# Patient Record
Sex: MALE | Race: OTHER | HISPANIC OR LATINO | Employment: FULL TIME | URBAN - METROPOLITAN AREA
[De-identification: names, ages, dates, MRNs, and addresses within clinical notes are randomized per-mention and may not be internally consistent; named-entity substitution may affect disease eponyms.]

---

## 2024-02-19 ENCOUNTER — OFFICE VISIT (OUTPATIENT)
Dept: CARDIOLOGY | Facility: CLINIC | Age: 44
End: 2024-02-19
Payer: COMMERCIAL

## 2024-02-19 VITALS
DIASTOLIC BLOOD PRESSURE: 63 MMHG | SYSTOLIC BLOOD PRESSURE: 122 MMHG | WEIGHT: 206 LBS | RESPIRATION RATE: 16 BRPM | OXYGEN SATURATION: 98 % | HEART RATE: 63 BPM

## 2024-02-19 DIAGNOSIS — R07.9 CHEST PAIN, UNSPECIFIED TYPE: Primary | ICD-10-CM

## 2024-02-19 PROCEDURE — 99204 OFFICE O/P NEW MOD 45 MIN: CPT | Performed by: STUDENT IN AN ORGANIZED HEALTH CARE EDUCATION/TRAINING PROGRAM

## 2024-02-19 PROCEDURE — 93000 ELECTROCARDIOGRAM COMPLETE: CPT | Performed by: STUDENT IN AN ORGANIZED HEALTH CARE EDUCATION/TRAINING PROGRAM

## 2024-02-19 RX ORDER — ROSUVASTATIN CALCIUM 40 MG/1
40 TABLET, COATED ORAL DAILY
COMMUNITY
End: 2024-05-02 | Stop reason: SDUPTHER

## 2024-02-19 RX ORDER — ASPIRIN 81 MG/1
81 TABLET ORAL DAILY
COMMUNITY
End: 2024-05-02 | Stop reason: SDUPTHER

## 2024-02-19 RX ORDER — METOPROLOL SUCCINATE 25 MG/1
25 TABLET, EXTENDED RELEASE ORAL DAILY
COMMUNITY
End: 2024-05-02 | Stop reason: SDUPTHER

## 2024-02-19 NOTE — PROGRESS NOTES
24    Santana Casey is a 43 y.o. male who is here to establish care.  He reports that in May 2023 he presented to me with heart attack at the outside hospital and eventually underwent PCI on May 10 and May 17 with total of 6 drug-eluting stents.  He reports that he was told that his heart function was weak.  He quit the smoking in .  His family history is pertinent for premature coronary artery disease.  His father  of heart attack at age 42 and also reports 3 of his brothers  at an early age as well due to heart attack.  Today he denies having chest pain.  Shortness of breath only with moderate to heavy activity.  EKG shows normal sinus rhythm with a rate of 60 with no significant ST-T changes.  Social history  Social History     Socioeconomic History    Marital status:      Spouse name: Not on file    Number of children: Not on file    Years of education: Not on file    Highest education level: Not on file   Occupational History    Not on file   Tobacco Use    Smoking status: Not on file    Smokeless tobacco: Not on file   Substance and Sexual Activity    Alcohol use: Not on file    Drug use: Not on file    Sexual activity: Not on file   Other Topics Concern    Not on file   Social History Narrative    Not on file     Social Determinants of Health     Financial Resource Strain: Not on file   Food Insecurity: Not on file   Transportation Needs: Not on file   Physical Activity: Not on file   Stress: Not on file   Social Connections: Not on file   Intimate Partner Violence: Not on file   Housing Stability: Not on file        Family History  No family history on file.     12 system point of review is negative except for what described in history of present illness    Allergies:  No Known Allergies     Outpatient Medications:  No current outpatient medications      Last Recorded Vitals:      2024     9:13 AM   Vitals   Systolic 122   Diastolic 63   Heart Rate 63   Resp 16   Weight (lb) 206  "  Visit Report Report    Visit Vitals  /63 (Patient Position: Sitting)   Pulse 63   Resp 16   Wt 93.4 kg (206 lb)   SpO2 98%        Physical Exam:    General: Awake, alert/oriented x3, well developed, no acute distress  Head: Atraumatic/Normocephalic  Eyes: Normal external exam, EOMI, PERRLA  ENT: Oropharynx normal, moist mucous membranes  Cardiovascular: RRR, S1/S2, no murmurs, rubs, or gallops, radial pulses +2, no edema of extremities  Pulmonary: CTAB, no respiratory distress. No wheezes, rales, or ronchi  Abdomen: +BS, soft, non-tender, nondistended, no guarding or rebound  MSK: No joint swelling, normal movements of all extremities. Range of motion- normal.  Extremities: no edema, no cyanosis  Neuro: Alert/oriented x3, no focal motor or sensory deficits  Psychiatric: Judgment intact. Appropriate mood and behavior      I reviewed recent available cardiac studies.      Labs    No results found for: \"WBC\", \"HGB\", \"HCT\", \"PLT\", \"CHOL\", \"TRIG\", \"HDL\", \"LDLDIRECT\", \"ALT\", \"AST\", \"NA\", \"K\", \"CL\", \"CREATININE\", \"BUN\", \"CO2\", \"TSH\", \"PSA\", \"INR\", \"GLUF\", \"HGBA1C\", \"ALBUR\"  No results found for: \"CKTOTAL\", \"CKMB\", \"CKMBINDEX\", \"TROPONINI\"     No results found for: \"INR\", \"PROTIME\"          Assessment/Plan     Will asked the patient to sign consent form for medical release that he can receive information from outside facility regarding his cardiac care in 2023.    We will continue with current medications.    Will obtain echocardiogram to evaluate cardiac function considering his extensive CAD and history of heart arrhythmia.  For further medical management.    I asked the patient to seek emergent medical care if he develops any significant chest pain.  .      Discussed the importance of heart healthy diet and exercise.    Follow-up in clinic in 3 months with prior labs including CMP, Mg, lipids and EKG at the time of visit.   Will plan to have his LDL below 55.          Rodríguez Sanchez MD, PhD, FACC, " [facility]  Interventional Cardiology, [facility]  Associate Professor of Medicine, [facility]  Office: [phone]         **Disclaimer: This note was dictated by speech recognition, and every effort has been made to prevent any error in transcription, however minor errors may be present**

## 2024-02-27 DIAGNOSIS — I48.91 ATRIAL FIBRILLATION, UNSPECIFIED TYPE (MULTI): ICD-10-CM

## 2024-02-28 ENCOUNTER — HOSPITAL ENCOUNTER (OUTPATIENT)
Dept: CARDIOLOGY | Facility: CLINIC | Age: 44
Discharge: HOME | End: 2024-02-28
Payer: COMMERCIAL

## 2024-02-28 VITALS
BODY MASS INDEX: 30.51 KG/M2 | DIASTOLIC BLOOD PRESSURE: 63 MMHG | SYSTOLIC BLOOD PRESSURE: 122 MMHG | WEIGHT: 206 LBS | HEIGHT: 69 IN

## 2024-02-28 DIAGNOSIS — R06.02 SHORTNESS OF BREATH: ICD-10-CM

## 2024-02-28 DIAGNOSIS — Z82.41 FAMILY HISTORY OF SUDDEN CARDIAC DEATH: ICD-10-CM

## 2024-02-28 DIAGNOSIS — R07.9 CHEST PAIN, UNSPECIFIED TYPE: ICD-10-CM

## 2024-02-28 DIAGNOSIS — I25.10 ATHEROSCLEROTIC HEART DISEASE OF NATIVE CORONARY ARTERY WITHOUT ANGINA PECTORIS: ICD-10-CM

## 2024-02-28 LAB
AORTIC VALVE MEAN GRADIENT: 2.5 MMHG
AORTIC VALVE PEAK VELOCITY: 1.03 M/S
AV PEAK GRADIENT: 4.2 MMHG
AVA (PEAK VEL): 3.91 CM2
AVA (VTI): 3.66 CM2
EJECTION FRACTION APICAL 4 CHAMBER: 65.2
EJECTION FRACTION: 63 %
LEFT ATRIUM VOLUME AREA LENGTH INDEX BSA: 27.3 ML/M2
LEFT VENTRICLE INTERNAL DIMENSION DIASTOLE: 4.93 CM (ref 3.5–6)
LEFT VENTRICULAR OUTFLOW TRACT DIAMETER: 2.37 CM
MITRAL VALVE E/A RATIO: 1.43
RIGHT VENTRICLE FREE WALL PEAK S': 1.4 CM/S
TRICUSPID ANNULAR PLANE SYSTOLIC EXCURSION: 3.5 CM

## 2024-02-28 PROCEDURE — 93306 TTE W/DOPPLER COMPLETE: CPT

## 2024-02-28 PROCEDURE — 93306 TTE W/DOPPLER COMPLETE: CPT | Performed by: STUDENT IN AN ORGANIZED HEALTH CARE EDUCATION/TRAINING PROGRAM

## 2024-03-15 PROBLEM — R07.9 CHEST PAIN: Status: ACTIVE | Noted: 2024-03-15

## 2024-03-15 RX ORDER — ACETAMINOPHEN 325 MG/1
650 TABLET ORAL
COMMUNITY
Start: 2024-02-08

## 2024-03-15 RX ORDER — DIPHENHYDRAMINE HCL 25 MG
2 CAPSULE ORAL NIGHTLY
COMMUNITY
Start: 2024-02-08 | End: 2024-05-29 | Stop reason: WASHOUT

## 2024-03-15 RX ORDER — FLUTICASONE PROPIONATE 50 MCG
1 SPRAY, SUSPENSION (ML) NASAL
COMMUNITY
Start: 2024-02-08 | End: 2024-05-29 | Stop reason: WASHOUT

## 2024-03-19 ENCOUNTER — OFFICE VISIT (OUTPATIENT)
Dept: PRIMARY CARE | Facility: CLINIC | Age: 44
End: 2024-03-19
Payer: COMMERCIAL

## 2024-03-19 VITALS
RESPIRATION RATE: 16 BRPM | WEIGHT: 209 LBS | SYSTOLIC BLOOD PRESSURE: 142 MMHG | HEART RATE: 67 BPM | HEIGHT: 69 IN | TEMPERATURE: 97.7 F | DIASTOLIC BLOOD PRESSURE: 83 MMHG | OXYGEN SATURATION: 98 % | BODY MASS INDEX: 30.96 KG/M2

## 2024-03-19 DIAGNOSIS — I25.2 CORONARY ARTERY DISEASE WITH HISTORY OF MYOCARDIAL INFARCTION WITHOUT HISTORY OF CABG: ICD-10-CM

## 2024-03-19 DIAGNOSIS — Z12.11 SCREENING FOR COLON CANCER: Primary | ICD-10-CM

## 2024-03-19 DIAGNOSIS — I25.10 CORONARY ARTERY DISEASE WITH HISTORY OF MYOCARDIAL INFARCTION WITHOUT HISTORY OF CABG: ICD-10-CM

## 2024-03-19 DIAGNOSIS — E78.5 HYPERLIPIDEMIA, UNSPECIFIED HYPERLIPIDEMIA TYPE: ICD-10-CM

## 2024-03-19 PROCEDURE — 99204 OFFICE O/P NEW MOD 45 MIN: CPT | Performed by: NURSE PRACTITIONER

## 2024-03-19 PROCEDURE — 99214 OFFICE O/P EST MOD 30 MIN: CPT | Performed by: NURSE PRACTITIONER

## 2024-03-19 PROCEDURE — 1036F TOBACCO NON-USER: CPT | Performed by: NURSE PRACTITIONER

## 2024-03-19 ASSESSMENT — PAIN SCALES - GENERAL: PAINLEVEL: 0-NO PAIN

## 2024-03-19 NOTE — PATIENT INSTRUCTIONS
Thank you for coming in for your visit today!    Please follow up in 6 months or sooner if needed.    Have blood work from the cardiologist completed at your convenience. Be sure to go fasting for blood work, this means nothing to eat for 10 hours    Continue to take medications as prescribed.     We can sign paperwork after your blood work screening is completed.     Call 911 or go to the emergency room if you have pain in your chest, difficulty breathing, or other life threatening symptoms.

## 2024-03-19 NOTE — PROGRESS NOTES
"Sabion Casey is a 43 y.o. male who presents for New Patient Visit.  HPI  Mr. Casey is a 44 yo M here today to establish care.     History of MI, with PCI completed on 5/10/23 and 23.     Smoker until .   Family history: CAD, father  of MI at 42  3 brother  at early age 2/2 MI    Patient has pending blood work for completion through cardiology given significant CV history.   He was otherwise UTD with annual screenings, last completed in April through his previous PCP in [place].   He notes that his life has changed significantly since his his MI. He feels like he was working very long hours, feeling energetic, etc prior to MI and that life has slowed down. He is still working, but his flow and endurance have changed. He has been fearful of resuming exercise routine. Working with his cardiologist.   He has been compliant with medications. Generally feeling okay.     Due for colonoscopy this year. Denies family history. Denies change in bowel patterns or concern.       All systems reviewed. Review of systems negative except for noted positives in HPI    Objective     /83   Pulse 67   Temp 36.5 °C (97.7 °F)   Resp 16   Ht 1.753 m (5' 9\")   Wt 94.8 kg (209 lb)   SpO2 98%   BMI 30.86 kg/m²    Vital signs noted and reviewed.       Physical Exam  Constitutional:       Appearance: Normal appearance.   Cardiovascular:      Rate and Rhythm: Normal rate and regular rhythm.   Pulmonary:      Effort: Pulmonary effort is normal. No respiratory distress.      Breath sounds: Normal breath sounds.   Skin:     General: Skin is warm and dry.   Neurological:      Mental Status: He is oriented to person, place, and time.   Psychiatric:         Mood and Affect: Mood normal.             Assessment/Plan   Problem List Items Addressed This Visit    None  Visit Diagnoses       Screening for colon cancer    -  Primary    Relevant Orders    Colonoscopy Screening; Average Risk Patient    Coronary " artery disease with history of myocardial infarction without history of CABG        Hyperlipidemia, unspecified hyperlipidemia type

## 2024-03-23 ENCOUNTER — LAB (OUTPATIENT)
Dept: LAB | Facility: LAB | Age: 44
End: 2024-03-23
Payer: COMMERCIAL

## 2024-03-23 DIAGNOSIS — R07.9 CHEST PAIN, UNSPECIFIED TYPE: ICD-10-CM

## 2024-03-23 LAB
ALBUMIN SERPL BCP-MCNC: 4.4 G/DL (ref 3.4–5)
ALP SERPL-CCNC: 50 U/L (ref 33–120)
ALT SERPL W P-5'-P-CCNC: 20 U/L (ref 10–52)
ANION GAP SERPL CALC-SCNC: 10 MMOL/L (ref 10–20)
AST SERPL W P-5'-P-CCNC: 20 U/L (ref 9–39)
BILIRUB SERPL-MCNC: 1.1 MG/DL (ref 0–1.2)
BUN SERPL-MCNC: 11 MG/DL (ref 6–23)
CALCIUM SERPL-MCNC: 9.4 MG/DL (ref 8.6–10.3)
CHLORIDE SERPL-SCNC: 105 MMOL/L (ref 98–107)
CHOLEST SERPL-MCNC: 102 MG/DL (ref 0–199)
CHOLESTEROL/HDL RATIO: 2.8
CO2 SERPL-SCNC: 30 MMOL/L (ref 21–32)
CREAT SERPL-MCNC: 0.97 MG/DL (ref 0.5–1.3)
EGFRCR SERPLBLD CKD-EPI 2021: >90 ML/MIN/1.73M*2
GLUCOSE SERPL-MCNC: 93 MG/DL (ref 74–99)
HDLC SERPL-MCNC: 36.4 MG/DL
LDLC SERPL CALC-MCNC: 45 MG/DL
MAGNESIUM SERPL-MCNC: 2.22 MG/DL (ref 1.6–2.4)
NON HDL CHOLESTEROL: 66 MG/DL (ref 0–149)
POTASSIUM SERPL-SCNC: 4 MMOL/L (ref 3.5–5.3)
PROT SERPL-MCNC: 7.2 G/DL (ref 6.4–8.2)
SODIUM SERPL-SCNC: 141 MMOL/L (ref 136–145)
TRIGL SERPL-MCNC: 102 MG/DL (ref 0–149)
VLDL: 20 MG/DL (ref 0–40)

## 2024-03-23 PROCEDURE — 80061 LIPID PANEL: CPT

## 2024-03-23 PROCEDURE — 36415 COLL VENOUS BLD VENIPUNCTURE: CPT

## 2024-03-23 PROCEDURE — 80053 COMPREHEN METABOLIC PANEL: CPT

## 2024-03-23 PROCEDURE — 83735 ASSAY OF MAGNESIUM: CPT

## 2024-03-28 DIAGNOSIS — Z12.11 COLON CANCER SCREENING: Primary | ICD-10-CM

## 2024-03-28 RX ORDER — SODIUM, POTASSIUM,MAG SULFATES 17.5-3.13G
1 SOLUTION, RECONSTITUTED, ORAL ORAL 2 TIMES DAILY
Qty: 354 ML | Refills: 0 | Status: SHIPPED | OUTPATIENT
Start: 2024-03-28

## 2024-04-09 ENCOUNTER — APPOINTMENT (OUTPATIENT)
Dept: PRIMARY CARE | Facility: CLINIC | Age: 44
End: 2024-04-09
Payer: COMMERCIAL

## 2024-05-02 DIAGNOSIS — R07.9 CHEST PAIN, UNSPECIFIED TYPE: ICD-10-CM

## 2024-05-02 DIAGNOSIS — E78.5 HYPERLIPIDEMIA, UNSPECIFIED HYPERLIPIDEMIA TYPE: ICD-10-CM

## 2024-05-02 DIAGNOSIS — I10 HYPERTENSION, UNSPECIFIED TYPE: ICD-10-CM

## 2024-05-03 RX ORDER — METOPROLOL SUCCINATE 25 MG/1
25 TABLET, EXTENDED RELEASE ORAL DAILY
Qty: 90 TABLET | Refills: 1 | Status: SHIPPED | OUTPATIENT
Start: 2024-05-03

## 2024-05-03 RX ORDER — ROSUVASTATIN CALCIUM 40 MG/1
40 TABLET, COATED ORAL DAILY
Qty: 90 TABLET | Refills: 1 | Status: SHIPPED | OUTPATIENT
Start: 2024-05-03 | End: 2024-05-29 | Stop reason: SDUPTHER

## 2024-05-03 RX ORDER — ASPIRIN 81 MG/1
81 TABLET ORAL DAILY
Qty: 90 TABLET | Refills: 1 | Status: SHIPPED | OUTPATIENT
Start: 2024-05-03

## 2024-05-13 ENCOUNTER — DOCUMENTATION (OUTPATIENT)
Dept: PRIMARY CARE | Facility: CLINIC | Age: 44
End: 2024-05-13
Payer: COMMERCIAL

## 2024-05-13 PROBLEM — I25.10 ATHEROSCLEROSIS OF CORONARY ARTERY: Status: ACTIVE | Noted: 2024-03-19

## 2024-05-13 PROBLEM — E78.5 HYPERLIPIDEMIA: Status: ACTIVE | Noted: 2024-03-19

## 2024-05-13 PROBLEM — R06.02 SHORTNESS OF BREATH: Status: ACTIVE | Noted: 2024-05-13

## 2024-05-13 NOTE — PROGRESS NOTES
Pt pharmacy called requesting clarification of pt metoprolol order. States instructions says 1 tab daily, then states taking 0.5 tabs. Noted pt not seen in this clinic. Message sent to pt primary r/t clarification need.

## 2024-05-29 ENCOUNTER — OFFICE VISIT (OUTPATIENT)
Dept: CARDIOLOGY | Facility: CLINIC | Age: 44
End: 2024-05-29
Payer: COMMERCIAL

## 2024-05-29 VITALS
WEIGHT: 205 LBS | OXYGEN SATURATION: 98 % | BODY MASS INDEX: 30.27 KG/M2 | DIASTOLIC BLOOD PRESSURE: 80 MMHG | HEART RATE: 60 BPM | SYSTOLIC BLOOD PRESSURE: 139 MMHG

## 2024-05-29 DIAGNOSIS — I25.10 ATHEROSCLEROSIS OF NATIVE CORONARY ARTERY OF NATIVE HEART WITHOUT ANGINA PECTORIS: Primary | ICD-10-CM

## 2024-05-29 DIAGNOSIS — E78.5 HYPERLIPIDEMIA, UNSPECIFIED HYPERLIPIDEMIA TYPE: ICD-10-CM

## 2024-05-29 PROCEDURE — 99214 OFFICE O/P EST MOD 30 MIN: CPT | Performed by: STUDENT IN AN ORGANIZED HEALTH CARE EDUCATION/TRAINING PROGRAM

## 2024-05-29 PROCEDURE — 1036F TOBACCO NON-USER: CPT | Performed by: STUDENT IN AN ORGANIZED HEALTH CARE EDUCATION/TRAINING PROGRAM

## 2024-05-29 RX ORDER — ROSUVASTATIN CALCIUM 20 MG/1
20 TABLET, COATED ORAL DAILY
Qty: 90 TABLET | Refills: 3 | Status: SHIPPED | OUTPATIENT
Start: 2024-05-29 | End: 2025-05-29

## 2024-05-29 NOTE — PROGRESS NOTES
Signed  Encounter Date: 2024  Rodríguez Sanchez MD PhD     Expand All Collapse All  24     Santana Casey is a 43 y.o. male who is here to establish care.  He reports that in May 2023 he presented to me with heart attack at the outside hospital and eventually underwent PCI on May 10 and May 17 with total of 6 drug-eluting stents.  He reports that he was told that his heart function was weak.  He quit the smoking in .  His family history is pertinent for premature coronary artery disease.  His father  of heart attack at age 42 and also reports 3 of his brothers  at an early age as well due to heart attack.  Today he denies having chest pain.  Shortness of breath only with moderate to heavy activity.  EKG shows normal sinus rhythm with a rate of 60 with no significant ST-T changes.        TTE 2024:  CONCLUSIONS:   1. Left ventricular systolic function is normal with a 60-65% estimated ejection fraction.       Follow up visit    24    Patient denies having any chest pain or shortness of breath or palpitation.  His dose of Crestor has been increased to 40 mg by primary care physician lately but he reports that soon after starting this new dose he has been having muscle pain and fatigue and cannot do exercise anymore.  His LDL was 45.    Past Medical History  No past medical history on file.     Past Surgical History  No past surgical history on file.     Social history  Social History     Socioeconomic History    Marital status:      Spouse name: Not on file    Number of children: Not on file    Years of education: Not on file    Highest education level: Not on file   Occupational History    Not on file   Tobacco Use    Smoking status: Never    Smokeless tobacco: Never   Substance and Sexual Activity    Alcohol use: Never    Drug use: Never    Sexual activity: Not on file   Other Topics Concern    Not on file   Social History Narrative    Not on file     Social Determinants of  "Health     Financial Resource Strain: Not on file   Food Insecurity: Not on file   Transportation Needs: Not on file   Physical Activity: Not on file   Stress: Not on file   Social Connections: Not on file   Intimate Partner Violence: Not on file   Housing Stability: Not on file        Family History  No family history on file.     12 system point of review is negative except for what described in history of present illness    Allergies:  No Known Allergies     Outpatient Medications:  Current Outpatient Medications   Medication Instructions    acetaminophen (TYLENOL) 650 mg, oral    aspirin 81 mg, oral, Daily    metoprolol succinate XL (TOPROL-XL) 25 mg, oral, Daily, Do not crush or chew. Patient taking 0.5 daily    rosuvastatin (CRESTOR) 40 mg, oral, Daily    sodium,potassium,mag sulfates (Suprep) 17.5-3.13-1.6 gram recon soln solution 1 bottle, oral, 2 times daily, Take one bottle twice as directed by the prep instructions    ticagrelor (BRILINTA) 90 mg, oral, 2 times daily         Last Recorded Vitals:      2/19/2024     9:13 AM 2/28/2024     1:04 PM 3/19/2024     3:00 PM 5/29/2024    10:24 AM   Vitals   Systolic 122 122 142 139   Diastolic 63 63 83 80   Heart Rate 63  67 60   Temp   36.5 °C (97.7 °F)    Resp 16  16    Height (in)  1.753 m (5' 9\") 1.753 m (5' 9\")    Weight (lb) 206 206 209 205   BMI  30.42 kg/m2 30.86 kg/m2 30.27 kg/m2   BSA (m2)  2.13 m2 2.15 m2 2.13 m2   Visit Report Report  Report Report    Visit Vitals  /80 (BP Location: Left arm, Patient Position: Sitting, BP Cuff Size: Adult)   Pulse 60   Wt 93 kg (205 lb)   SpO2 98%   BMI 30.27 kg/m²   Smoking Status Never   BSA 2.13 m²        Physical Exam:    General: Awake, alert/oriented x3, well developed, no acute distress  Head: Atraumatic/Normocephalic  Eyes: Normal external exam, EOMI, PERRLA  ENT: Oropharynx normal, moist mucous membranes  Cardiovascular: RRR, S1/S2, no murmurs, rubs, or gallops, radial pulses +2, no edema of " "extremities  Pulmonary: CTAB, no respiratory distress. No wheezes, rales, or ronchi  Abdomen: +BS, soft, non-tender, nondistended, no guarding or rebound  MSK: No joint swelling, normal movements of all extremities. Range of motion- normal.  Extremities: no edema, no cyanosis  Neuro: Alert/oriented x3, no focal motor or sensory deficits  Psychiatric: Judgment intact. Appropriate mood and behavior      I reviewed recent available cardiac studies.      Labs    Lab Results   Component Value Date    CHOL 102 03/23/2024    TRIG 102 03/23/2024    HDL 36.4 03/23/2024    ALT 20 03/23/2024    AST 20 03/23/2024     03/23/2024    K 4.0 03/23/2024     03/23/2024    CREATININE 0.97 03/23/2024    BUN 11 03/23/2024    CO2 30 03/23/2024     No results found for: \"CKTOTAL\", \"CKMB\", \"CKMBINDEX\", \"TROPONINI\"     No results found for: \"INR\", \"PROTIME\"          Assessment/Plan     Considering that his LDL was low and he is very symptomatic with Crestor 40 mg and he tolerated 20 mg well in the past, I will go back down to 20 mg daily and we will obtain lipid profile before next visit.  Will continue with other medications from cardiac standpoint.  Follow-up with me in 1 year          Rodríguez Sanchez MD, PhD, [facility], [facility]  Interventional Cardiology, [facility]  Associate Professor of Medicine, [facility]  Office: [phone]         **Disclaimer: This note was dictated by speech recognition, and every effort has been made to prevent any error in transcription, however minor errors may be present**    "

## 2024-07-18 DIAGNOSIS — H53.8 BLURRED VISION: ICD-10-CM

## 2024-08-23 ENCOUNTER — APPOINTMENT (OUTPATIENT)
Dept: OPHTHALMOLOGY | Facility: CLINIC | Age: 44
End: 2024-08-23
Payer: COMMERCIAL

## 2024-08-23 DIAGNOSIS — H52.223 REGULAR ASTIGMATISM OF BOTH EYES: ICD-10-CM

## 2024-08-23 DIAGNOSIS — H17.9 CORNEAL SCAR, LEFT EYE: Primary | ICD-10-CM

## 2024-08-23 DIAGNOSIS — H52.12 MYOPIA OF LEFT EYE: ICD-10-CM

## 2024-08-23 DIAGNOSIS — H52.4 PRESBYOPIA: ICD-10-CM

## 2024-08-23 PROCEDURE — 99204 OFFICE O/P NEW MOD 45 MIN: CPT | Performed by: OPTOMETRIST

## 2024-08-23 PROCEDURE — 92015 DETERMINE REFRACTIVE STATE: CPT | Performed by: OPTOMETRIST

## 2024-08-23 ASSESSMENT — REFRACTION
OS_SPHERE: -0.50
OS_CYLINDER: SPHERE
OS_ADD: +1.00
OD_ADD: +1.00
OD_CYLINDER: -1.00
OD_AXIS: 090
OD_SPHERE: PLANO

## 2024-08-23 ASSESSMENT — REFRACTION_MANIFEST
OS_CYLINDER: SPHERE
OS_ADD: +1.00
OD_ADD: +1.00
OD_AXIS: 090
OD_SPHERE: PLANO
OS_SPHERE: -0.75
OD_CYLINDER: -1.00

## 2024-08-23 ASSESSMENT — ENCOUNTER SYMPTOMS
CARDIOVASCULAR NEGATIVE: 0
NEUROLOGICAL NEGATIVE: 0
EYES NEGATIVE: 0
ALLERGIC/IMMUNOLOGIC NEGATIVE: 0
GASTROINTESTINAL NEGATIVE: 0
ENDOCRINE NEGATIVE: 0
MUSCULOSKELETAL NEGATIVE: 0
RESPIRATORY NEGATIVE: 0
CONSTITUTIONAL NEGATIVE: 0
PSYCHIATRIC NEGATIVE: 0
HEMATOLOGIC/LYMPHATIC NEGATIVE: 0

## 2024-08-23 ASSESSMENT — TONOMETRY
OS_IOP_MMHG: 14
IOP_METHOD: TONOPEN
OD_IOP_MMHG: 13

## 2024-08-23 ASSESSMENT — VISUAL ACUITY
OS_SC: 20/20
METHOD: SNELLEN - LINEAR
OD_SC: 20/25
OD_SC+: -2

## 2024-08-23 ASSESSMENT — CONF VISUAL FIELD
OS_INFERIOR_TEMPORAL_RESTRICTION: 0
OD_INFERIOR_TEMPORAL_RESTRICTION: 0
OD_INFERIOR_NASAL_RESTRICTION: 0
OS_NORMAL: 1
OD_SUPERIOR_TEMPORAL_RESTRICTION: 0
OS_SUPERIOR_NASAL_RESTRICTION: 0
OD_SUPERIOR_NASAL_RESTRICTION: 0
OS_SUPERIOR_TEMPORAL_RESTRICTION: 0
METHOD: COUNTING FINGERS
OS_INFERIOR_NASAL_RESTRICTION: 0
OD_NORMAL: 1

## 2024-08-23 ASSESSMENT — CUP TO DISC RATIO
OD_RATIO: .55
OS_RATIO: .5

## 2024-08-23 ASSESSMENT — SLIT LAMP EXAM - LIDS
COMMENTS: NORMAL
COMMENTS: NORMAL

## 2024-08-23 ASSESSMENT — EXTERNAL EXAM - LEFT EYE: OS_EXAM: NORMAL

## 2024-08-23 ASSESSMENT — EXTERNAL EXAM - RIGHT EYE: OD_EXAM: NORMAL

## 2024-08-23 NOTE — PROGRESS NOTES
Assessment/Plan   Diagnoses and all orders for this visit:  Corneal scar, left eye  Not VS. Hx of chemical injury w/ aluminium. Monitor.     Presbyopia  Regular astigmatism of both eyes  Myopia of left eye  New spec rx released today per patient request. Ocular health wnl for age OU. Monitor 1 year or sooner prn. Refraction billed today. NEAR VISION ONLY Rx released.

## 2024-10-24 ENCOUNTER — TELEPHONE (OUTPATIENT)
Dept: CARDIOLOGY | Facility: CLINIC | Age: 44
End: 2024-10-24
Payer: COMMERCIAL

## 2024-10-24 NOTE — TELEPHONE ENCOUNTER
Pt's wife calling to report pt is having colonoscopy on 11/7/24 and asking if ok to hold Brilinta, if so, how long?    Office did not receive clearance request.    Please advise. Thanks.

## 2024-10-24 NOTE — TELEPHONE ENCOUNTER
Per Dr. Sanchez, called Anni and notified her that pt can hold Plavix for 5 days prior to procedure, resume asap post procedure and he needs to continue BASA without interruption. Anni verbalizes understanding.

## 2024-11-07 ENCOUNTER — ANESTHESIA EVENT (OUTPATIENT)
Dept: GASTROENTEROLOGY | Facility: HOSPITAL | Age: 44
End: 2024-11-07
Payer: COMMERCIAL

## 2024-11-07 ENCOUNTER — HOSPITAL ENCOUNTER (OUTPATIENT)
Dept: GASTROENTEROLOGY | Facility: HOSPITAL | Age: 44
Setting detail: OUTPATIENT SURGERY
Discharge: HOME | End: 2024-11-07
Payer: COMMERCIAL

## 2024-11-07 ENCOUNTER — ANESTHESIA (OUTPATIENT)
Dept: GASTROENTEROLOGY | Facility: HOSPITAL | Age: 44
End: 2024-11-07
Payer: COMMERCIAL

## 2024-11-07 VITALS
BODY MASS INDEX: 30.37 KG/M2 | HEART RATE: 62 BPM | WEIGHT: 205.03 LBS | DIASTOLIC BLOOD PRESSURE: 62 MMHG | RESPIRATION RATE: 16 BRPM | TEMPERATURE: 97 F | OXYGEN SATURATION: 96 % | SYSTOLIC BLOOD PRESSURE: 123 MMHG | HEIGHT: 69 IN

## 2024-11-07 DIAGNOSIS — Z12.11 SCREENING FOR COLON CANCER: ICD-10-CM

## 2024-11-07 PROCEDURE — 7100000010 HC PHASE TWO TIME - EACH INCREMENTAL 1 MINUTE

## 2024-11-07 PROCEDURE — A45380 PR COLONOSCOPY,BIOPSY: Performed by: NURSE ANESTHETIST, CERTIFIED REGISTERED

## 2024-11-07 PROCEDURE — A45380 PR COLONOSCOPY,BIOPSY: Performed by: ANESTHESIOLOGY

## 2024-11-07 PROCEDURE — 45380 COLONOSCOPY AND BIOPSY: CPT | Performed by: STUDENT IN AN ORGANIZED HEALTH CARE EDUCATION/TRAINING PROGRAM

## 2024-11-07 PROCEDURE — 3700000002 HC GENERAL ANESTHESIA TIME - EACH INCREMENTAL 1 MINUTE

## 2024-11-07 PROCEDURE — 2500000004 HC RX 250 GENERAL PHARMACY W/ HCPCS (ALT 636 FOR OP/ED): Performed by: NURSE ANESTHETIST, CERTIFIED REGISTERED

## 2024-11-07 PROCEDURE — 3700000001 HC GENERAL ANESTHESIA TIME - INITIAL BASE CHARGE

## 2024-11-07 PROCEDURE — 7100000009 HC PHASE TWO TIME - INITIAL BASE CHARGE

## 2024-11-07 RX ORDER — LIDOCAINE HCL/PF 100 MG/5ML
SYRINGE (ML) INTRAVENOUS AS NEEDED
Status: DISCONTINUED | OUTPATIENT
Start: 2024-11-07 | End: 2024-11-07

## 2024-11-07 RX ORDER — PROPOFOL 10 MG/ML
INJECTION, EMULSION INTRAVENOUS AS NEEDED
Status: DISCONTINUED | OUTPATIENT
Start: 2024-11-07 | End: 2024-11-07

## 2024-11-07 RX ORDER — ONDANSETRON HYDROCHLORIDE 2 MG/ML
4 INJECTION, SOLUTION INTRAVENOUS ONCE AS NEEDED
Status: DISCONTINUED | OUTPATIENT
Start: 2024-11-07 | End: 2024-11-08 | Stop reason: HOSPADM

## 2024-11-07 SDOH — HEALTH STABILITY: MENTAL HEALTH: CURRENT SMOKER: 0

## 2024-11-07 ASSESSMENT — PAIN - FUNCTIONAL ASSESSMENT: PAIN_FUNCTIONAL_ASSESSMENT: 0-10

## 2024-11-07 ASSESSMENT — COLUMBIA-SUICIDE SEVERITY RATING SCALE - C-SSRS
1. IN THE PAST MONTH, HAVE YOU WISHED YOU WERE DEAD OR WISHED YOU COULD GO TO SLEEP AND NOT WAKE UP?: NO
6. HAVE YOU EVER DONE ANYTHING, STARTED TO DO ANYTHING, OR PREPARED TO DO ANYTHING TO END YOUR LIFE?: NO
2. HAVE YOU ACTUALLY HAD ANY THOUGHTS OF KILLING YOURSELF?: NO

## 2024-11-07 ASSESSMENT — PAIN SCALES - GENERAL
PAINLEVEL_OUTOF10: 0 - NO PAIN

## 2024-11-07 NOTE — ANESTHESIA POSTPROCEDURE EVALUATION
Patient: Santana Casey    Procedure Summary       Date: 11/07/24 Room / Location: [facility]    Anesthesia Start: 0814 Anesthesia Stop:     Procedure: COLONOSCOPY Diagnosis: Screening for colon cancer    Scheduled Providers: Ty Melo MD; Rj Ballard MD Responsible Provider: Rj Ballard MD    Anesthesia Type: MAC ASA Status: 3            Anesthesia Type: MAC    Vitals Value Taken Time   /62 11/07/24 0851   Temp 36.1 11/07/24 0851   Pulse 73 11/07/24 0851   Resp 18 11/07/24 0851   SpO2 96% 11/07/24 0851       Anesthesia Post Evaluation    Patient location during evaluation: PACU  Patient participation: complete - patient participated  Level of consciousness: sleepy but conscious  Pain management: adequate  Airway patency: patent  Cardiovascular status: acceptable  Respiratory status: acceptable and room air  Hydration status: acceptable  Postoperative Nausea and Vomiting: none        There were no known notable events for this encounter.

## 2024-11-07 NOTE — DISCHARGE INSTRUCTIONS

## 2024-11-07 NOTE — ANESTHESIA PREPROCEDURE EVALUATION
Patient: Santana Casey    Procedure Information       Date/Time: 11/07/24 0800    Scheduled providers: Ty Melo MD; Rj Ballard MD    Procedure: COLONOSCOPY    Location: [facility]            Relevant Problems   Anesthesia (within normal limits)      Cardiac   (+) Atherosclerosis of coronary artery   (+) Chest pain   (+) Hyperlipidemia       Clinical information reviewed:   Tobacco  Allergies  Meds   Med Hx  Surg Hx   Fam Hx  Soc Hx        NPO Detail:  NPO/Void Status  Date of Last Liquid: 11/07/24  Time of Last Liquid: 0000  Date of Last Solid: 11/04/24  Time of Last Solid: 0000         Physical Exam    Airway  Mallampati: II  TM distance: >3 FB  Neck ROM: full     Cardiovascular   Rhythm: regular  Rate: normal     Dental - normal exam     Pulmonary    Abdominal        Anesthesia Plan    History of general anesthesia?: yes  History of complications of general anesthesia?: no    ASA 3     MAC     The patient is not a current smoker.  Patient was not previously instructed to abstain from smoking on day of procedure.  Patient did not smoke on day of procedure.    intravenous induction   Anesthetic plan and risks discussed with patient.  Use of blood products discussed with patient who.    Plan discussed with CRNA.

## 2024-11-15 LAB
LABORATORY COMMENT REPORT: NORMAL
PATH REPORT.FINAL DX SPEC: NORMAL
PATH REPORT.GROSS SPEC: NORMAL
PATH REPORT.RELEVANT HX SPEC: NORMAL
PATH REPORT.TOTAL CANCER: NORMAL

## 2024-11-20 DIAGNOSIS — R07.9 CHEST PAIN, UNSPECIFIED TYPE: ICD-10-CM

## 2024-11-20 RX ORDER — ASPIRIN 81 MG/1
81 TABLET ORAL DAILY
Qty: 90 TABLET | Refills: 1 | Status: SHIPPED | OUTPATIENT
Start: 2024-11-20

## 2024-11-25 ENCOUNTER — TELEMEDICINE (OUTPATIENT)
Dept: PRIMARY CARE | Facility: CLINIC | Age: 44
End: 2024-11-25
Payer: COMMERCIAL

## 2024-11-25 DIAGNOSIS — I48.91 ATRIAL FIBRILLATION, UNSPECIFIED TYPE (MULTI): ICD-10-CM

## 2024-11-25 DIAGNOSIS — I10 HYPERTENSION, UNSPECIFIED TYPE: ICD-10-CM

## 2024-11-25 DIAGNOSIS — G56.01 CARPAL TUNNEL SYNDROME OF RIGHT WRIST: Primary | ICD-10-CM

## 2024-11-25 PROCEDURE — 99213 OFFICE O/P EST LOW 20 MIN: CPT | Performed by: NURSE PRACTITIONER

## 2024-11-25 RX ORDER — METOPROLOL SUCCINATE 25 MG/1
25 TABLET, EXTENDED RELEASE ORAL DAILY
Qty: 90 TABLET | Refills: 1 | Status: SHIPPED | OUTPATIENT
Start: 2024-11-25

## 2024-11-25 NOTE — PROGRESS NOTES
Subjective   Santana Casey is a 44 y.o. male who presents for No chief complaint on file..  HPI  Virtual or Telephone Consent    An interactive audio and video telecommunication system which permits real time communications between the patient (at the originating site) and provider (at the distant site) was utilized to provide this telehealth service.   Verbal consent was requested and obtained from Santana Casey on this date, 11/26/24 for a telehealth visit.   Mr. Casey notes that he has been having some hand concerns.   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   Notes that he will open his hand fully and rest for a moment then it improves.   This occurs occasionally, it occurred yesterday while playing the guitar at [employer].   After 5- 10 of rest then this will improve and he can continue to play without issue.  He denies redness, swelling, or known injury or trauma to the area.     Otherwise, he is feeling well. He is working a less labor intense job, which he feels has optimized his health. He is taking medications as directed. Denies concerns or complaints today.      All systems reviewed. Review of systems negative except for noted positives in HPI    Objective     There were no vitals taken for this visit.   Vital signs noted and reviewed.       Physical Exam    PE deferred 2/2 virtual visit.    Assessment/Plan   Problem List Items Addressed This Visit    None  Visit Diagnoses       Carpal tunnel syndrome of right wrist    -  Primary    Relevant Orders    Referral to Orthopaedic Surgery    Hypertension, unspecified type        Relevant Medications    metoprolol succinate XL (Toprol-XL) 25 mg 24 hr tablet    Atrial fibrillation, unspecified type (Multi)        Relevant Medications    metoprolol succinate XL (Toprol-XL) 25 mg 24 hr tablet    ticagrelor  (Brilinta) 90 mg tablet

## 2025-01-06 ENCOUNTER — APPOINTMENT (OUTPATIENT)
Dept: GASTROENTEROLOGY | Facility: CLINIC | Age: 45
End: 2025-01-06
Payer: COMMERCIAL

## 2025-02-10 DIAGNOSIS — I48.91 ATRIAL FIBRILLATION, UNSPECIFIED TYPE (MULTI): ICD-10-CM

## 2025-02-10 DIAGNOSIS — I25.10 ATHEROSCLEROSIS OF NATIVE CORONARY ARTERY OF NATIVE HEART WITHOUT ANGINA PECTORIS: ICD-10-CM

## 2025-02-24 ENCOUNTER — APPOINTMENT (OUTPATIENT)
Dept: CARDIOLOGY | Facility: HOSPITAL | Age: 45
End: 2025-02-24
Payer: COMMERCIAL

## 2025-02-24 ENCOUNTER — HOSPITAL ENCOUNTER (OUTPATIENT)
Facility: HOSPITAL | Age: 45
Setting detail: OBSERVATION
Discharge: HOME | End: 2025-02-25
Attending: EMERGENCY MEDICINE | Admitting: INTERNAL MEDICINE
Payer: COMMERCIAL

## 2025-02-24 ENCOUNTER — APPOINTMENT (OUTPATIENT)
Dept: RADIOLOGY | Facility: HOSPITAL | Age: 45
End: 2025-02-24
Payer: COMMERCIAL

## 2025-02-24 DIAGNOSIS — R07.9 CHEST PAIN, UNSPECIFIED TYPE: ICD-10-CM

## 2025-02-24 DIAGNOSIS — R20.0 LEFT SIDED NUMBNESS: ICD-10-CM

## 2025-02-24 DIAGNOSIS — Z82.3 FAMILY HISTORY OF STROKE OR TRANSIENT ISCHEMIC ATTACK IN FATHER: ICD-10-CM

## 2025-02-24 DIAGNOSIS — R53.1 WEAKNESS: ICD-10-CM

## 2025-02-24 DIAGNOSIS — G45.9 TIA (TRANSIENT ISCHEMIC ATTACK): Primary | ICD-10-CM

## 2025-02-24 LAB
ALBUMIN SERPL BCP-MCNC: 4.5 G/DL (ref 3.4–5)
ALP SERPL-CCNC: 42 U/L (ref 33–120)
ALT SERPL W P-5'-P-CCNC: 20 U/L (ref 10–52)
ANION GAP SERPL CALC-SCNC: 15 MMOL/L (ref 10–20)
APTT PPP: 29 SECONDS (ref 27–38)
AST SERPL W P-5'-P-CCNC: 22 U/L (ref 9–39)
BASOPHILS # BLD AUTO: 0.03 X10*3/UL (ref 0–0.1)
BASOPHILS NFR BLD AUTO: 0.4 %
BILIRUB SERPL-MCNC: 0.7 MG/DL (ref 0–1.2)
BUN SERPL-MCNC: 12 MG/DL (ref 6–23)
CALCIUM SERPL-MCNC: 9.5 MG/DL (ref 8.6–10.3)
CARDIAC TROPONIN I PNL SERPL HS: <3 NG/L (ref 0–20)
CARDIAC TROPONIN I PNL SERPL HS: <3 NG/L (ref 0–20)
CHLORIDE SERPL-SCNC: 105 MMOL/L (ref 98–107)
CO2 SERPL-SCNC: 24 MMOL/L (ref 21–32)
CREAT SERPL-MCNC: 1.13 MG/DL (ref 0.5–1.3)
EGFRCR SERPLBLD CKD-EPI 2021: 81 ML/MIN/1.73M*2
EOSINOPHIL # BLD AUTO: 0.15 X10*3/UL (ref 0–0.7)
EOSINOPHIL NFR BLD AUTO: 1.8 %
ERYTHROCYTE [DISTWIDTH] IN BLOOD BY AUTOMATED COUNT: 12.3 % (ref 11.5–14.5)
GLUCOSE BLD MANUAL STRIP-MCNC: 104 MG/DL (ref 74–99)
GLUCOSE SERPL-MCNC: 100 MG/DL (ref 74–99)
HCT VFR BLD AUTO: 42.5 % (ref 41–52)
HGB BLD-MCNC: 14.5 G/DL (ref 13.5–17.5)
IMM GRANULOCYTES # BLD AUTO: 0.05 X10*3/UL (ref 0–0.7)
IMM GRANULOCYTES NFR BLD AUTO: 0.6 % (ref 0–0.9)
INR PPP: 1 (ref 0.9–1.1)
LYMPHOCYTES # BLD AUTO: 3.11 X10*3/UL (ref 1.2–4.8)
LYMPHOCYTES NFR BLD AUTO: 36.9 %
MAGNESIUM SERPL-MCNC: 2.06 MG/DL (ref 1.6–2.4)
MCH RBC QN AUTO: 29.9 PG (ref 26–34)
MCHC RBC AUTO-ENTMCNC: 34.1 G/DL (ref 32–36)
MCV RBC AUTO: 88 FL (ref 80–100)
MONOCYTES # BLD AUTO: 0.71 X10*3/UL (ref 0.1–1)
MONOCYTES NFR BLD AUTO: 8.4 %
NEUTROPHILS # BLD AUTO: 4.38 X10*3/UL (ref 1.2–7.7)
NEUTROPHILS NFR BLD AUTO: 51.9 %
NRBC BLD-RTO: 0 /100 WBCS (ref 0–0)
PLATELET # BLD AUTO: 218 X10*3/UL (ref 150–450)
POTASSIUM SERPL-SCNC: 4.3 MMOL/L (ref 3.5–5.3)
PROT SERPL-MCNC: 7.5 G/DL (ref 6.4–8.2)
PROTHROMBIN TIME: 11.8 SECONDS (ref 9.8–12.8)
RBC # BLD AUTO: 4.85 X10*6/UL (ref 4.5–5.9)
SODIUM SERPL-SCNC: 140 MMOL/L (ref 136–145)
WBC # BLD AUTO: 8.4 X10*3/UL (ref 4.4–11.3)

## 2025-02-24 PROCEDURE — 99223 1ST HOSP IP/OBS HIGH 75: CPT | Performed by: PSYCHIATRY & NEUROLOGY

## 2025-02-24 PROCEDURE — 36415 COLL VENOUS BLD VENIPUNCTURE: CPT | Performed by: EMERGENCY MEDICINE

## 2025-02-24 PROCEDURE — 84484 ASSAY OF TROPONIN QUANT: CPT | Performed by: EMERGENCY MEDICINE

## 2025-02-24 PROCEDURE — 83735 ASSAY OF MAGNESIUM: CPT | Performed by: EMERGENCY MEDICINE

## 2025-02-24 PROCEDURE — 99222 1ST HOSP IP/OBS MODERATE 55: CPT | Performed by: INTERNAL MEDICINE

## 2025-02-24 PROCEDURE — 70547 MR ANGIOGRAPHY NECK W/O DYE: CPT | Performed by: RADIOLOGY

## 2025-02-24 PROCEDURE — 85025 COMPLETE CBC W/AUTO DIFF WBC: CPT | Performed by: EMERGENCY MEDICINE

## 2025-02-24 PROCEDURE — 2500000001 HC RX 250 WO HCPCS SELF ADMINISTERED DRUGS (ALT 637 FOR MEDICARE OP): Performed by: INTERNAL MEDICINE

## 2025-02-24 PROCEDURE — G0378 HOSPITAL OBSERVATION PER HR: HCPCS

## 2025-02-24 PROCEDURE — 70544 MR ANGIOGRAPHY HEAD W/O DYE: CPT | Performed by: RADIOLOGY

## 2025-02-24 PROCEDURE — 99285 EMERGENCY DEPT VISIT HI MDM: CPT | Mod: 25 | Performed by: EMERGENCY MEDICINE

## 2025-02-24 PROCEDURE — 70544 MR ANGIOGRAPHY HEAD W/O DYE: CPT | Mod: 59

## 2025-02-24 PROCEDURE — 93306 TTE W/DOPPLER COMPLETE: CPT | Performed by: STUDENT IN AN ORGANIZED HEALTH CARE EDUCATION/TRAINING PROGRAM

## 2025-02-24 PROCEDURE — 93005 ELECTROCARDIOGRAM TRACING: CPT

## 2025-02-24 PROCEDURE — 80053 COMPREHEN METABOLIC PANEL: CPT | Performed by: EMERGENCY MEDICINE

## 2025-02-24 PROCEDURE — 70551 MRI BRAIN STEM W/O DYE: CPT

## 2025-02-24 PROCEDURE — 70450 CT HEAD/BRAIN W/O DYE: CPT | Performed by: RADIOLOGY

## 2025-02-24 PROCEDURE — 70547 MR ANGIOGRAPHY NECK W/O DYE: CPT

## 2025-02-24 PROCEDURE — 2500000002 HC RX 250 W HCPCS SELF ADMINISTERED DRUGS (ALT 637 FOR MEDICARE OP, ALT 636 FOR OP/ED): Performed by: INTERNAL MEDICINE

## 2025-02-24 PROCEDURE — 85730 THROMBOPLASTIN TIME PARTIAL: CPT | Performed by: EMERGENCY MEDICINE

## 2025-02-24 PROCEDURE — 70551 MRI BRAIN STEM W/O DYE: CPT | Performed by: RADIOLOGY

## 2025-02-24 PROCEDURE — 85610 PROTHROMBIN TIME: CPT | Performed by: EMERGENCY MEDICINE

## 2025-02-24 PROCEDURE — 82947 ASSAY GLUCOSE BLOOD QUANT: CPT | Mod: 59

## 2025-02-24 PROCEDURE — 70450 CT HEAD/BRAIN W/O DYE: CPT

## 2025-02-24 PROCEDURE — 93306 TTE W/DOPPLER COMPLETE: CPT

## 2025-02-24 RX ORDER — ACETAMINOPHEN 325 MG/1
650 TABLET ORAL EVERY 4 HOURS PRN
Status: DISCONTINUED | OUTPATIENT
Start: 2025-02-24 | End: 2025-02-25 | Stop reason: HOSPADM

## 2025-02-24 RX ORDER — ASPIRIN 81 MG/1
81 TABLET ORAL DAILY
Status: DISCONTINUED | OUTPATIENT
Start: 2025-02-24 | End: 2025-02-25 | Stop reason: HOSPADM

## 2025-02-24 RX ORDER — TALC
3 POWDER (GRAM) TOPICAL NIGHTLY PRN
Status: DISCONTINUED | OUTPATIENT
Start: 2025-02-24 | End: 2025-02-25 | Stop reason: HOSPADM

## 2025-02-24 RX ORDER — ALUMINUM HYDROXIDE, MAGNESIUM HYDROXIDE, AND SIMETHICONE 1200; 120; 1200 MG/30ML; MG/30ML; MG/30ML
30 SUSPENSION ORAL EVERY 6 HOURS PRN
Status: DISCONTINUED | OUTPATIENT
Start: 2025-02-24 | End: 2025-02-25 | Stop reason: HOSPADM

## 2025-02-24 RX ORDER — POLYETHYLENE GLYCOL 3350 17 G/17G
17 POWDER, FOR SOLUTION ORAL DAILY PRN
Status: DISCONTINUED | OUTPATIENT
Start: 2025-02-24 | End: 2025-02-25 | Stop reason: HOSPADM

## 2025-02-24 RX ORDER — METOPROLOL SUCCINATE 25 MG/1
25 TABLET, EXTENDED RELEASE ORAL DAILY
Status: DISCONTINUED | OUTPATIENT
Start: 2025-02-25 | End: 2025-02-25 | Stop reason: HOSPADM

## 2025-02-24 RX ORDER — ROSUVASTATIN CALCIUM 10 MG/1
20 TABLET, COATED ORAL NIGHTLY
Status: DISCONTINUED | OUTPATIENT
Start: 2025-02-24 | End: 2025-02-25 | Stop reason: HOSPADM

## 2025-02-24 RX ORDER — ASPIRIN 81 MG/1
81 TABLET ORAL DAILY
Status: DISCONTINUED | OUTPATIENT
Start: 2025-02-25 | End: 2025-02-24

## 2025-02-24 RX ORDER — ROSUVASTATIN CALCIUM 10 MG/1
20 TABLET, COATED ORAL NIGHTLY
Status: DISCONTINUED | OUTPATIENT
Start: 2025-02-24 | End: 2025-02-24

## 2025-02-24 RX ADMIN — ROSUVASTATIN CALCIUM 20 MG: 10 TABLET, FILM COATED ORAL at 21:09

## 2025-02-24 RX ADMIN — TICAGRELOR 90 MG: 90 TABLET ORAL at 21:09

## 2025-02-24 RX ADMIN — ASPIRIN 81 MG: 81 TABLET, COATED ORAL at 17:31

## 2025-02-24 SDOH — ECONOMIC STABILITY: INCOME INSECURITY: IN THE PAST 12 MONTHS HAS THE ELECTRIC, GAS, OIL, OR WATER COMPANY THREATENED TO SHUT OFF SERVICES IN YOUR HOME?: NO

## 2025-02-24 SDOH — SOCIAL STABILITY: SOCIAL INSECURITY: WITHIN THE LAST YEAR, HAVE YOU BEEN AFRAID OF YOUR PARTNER OR EX-PARTNER?: NO

## 2025-02-24 SDOH — SOCIAL STABILITY: SOCIAL INSECURITY: DO YOU FEEL ANYONE HAS EXPLOITED OR TAKEN ADVANTAGE OF YOU FINANCIALLY OR OF YOUR PERSONAL PROPERTY?: NO

## 2025-02-24 SDOH — SOCIAL STABILITY: SOCIAL INSECURITY: WITHIN THE LAST YEAR, HAVE YOU BEEN HUMILIATED OR EMOTIONALLY ABUSED IN OTHER WAYS BY YOUR PARTNER OR EX-PARTNER?: NO

## 2025-02-24 SDOH — SOCIAL STABILITY: SOCIAL INSECURITY: DO YOU FEEL UNSAFE GOING BACK TO THE PLACE WHERE YOU ARE LIVING?: NO

## 2025-02-24 SDOH — SOCIAL STABILITY: SOCIAL INSECURITY: DOES ANYONE TRY TO KEEP YOU FROM HAVING/CONTACTING OTHER FRIENDS OR DOING THINGS OUTSIDE YOUR HOME?: NO

## 2025-02-24 SDOH — SOCIAL STABILITY: SOCIAL INSECURITY
WITHIN THE LAST YEAR, HAVE YOU BEEN RAPED OR FORCED TO HAVE ANY KIND OF SEXUAL ACTIVITY BY YOUR PARTNER OR EX-PARTNER?: NO

## 2025-02-24 SDOH — SOCIAL STABILITY: SOCIAL INSECURITY
WITHIN THE LAST YEAR, HAVE YOU BEEN KICKED, HIT, SLAPPED, OR OTHERWISE PHYSICALLY HURT BY YOUR PARTNER OR EX-PARTNER?: NO

## 2025-02-24 SDOH — SOCIAL STABILITY: SOCIAL INSECURITY: ABUSE: ADULT

## 2025-02-24 SDOH — SOCIAL STABILITY: SOCIAL INSECURITY: ARE YOU OR HAVE YOU BEEN THREATENED OR ABUSED PHYSICALLY, EMOTIONALLY, OR SEXUALLY BY ANYONE?: NO

## 2025-02-24 SDOH — ECONOMIC STABILITY: FOOD INSECURITY: WITHIN THE PAST 12 MONTHS, YOU WORRIED THAT YOUR FOOD WOULD RUN OUT BEFORE YOU GOT THE MONEY TO BUY MORE.: NEVER TRUE

## 2025-02-24 SDOH — SOCIAL STABILITY: SOCIAL INSECURITY: WERE YOU ABLE TO COMPLETE ALL THE BEHAVIORAL HEALTH SCREENINGS?: YES

## 2025-02-24 SDOH — SOCIAL STABILITY: SOCIAL INSECURITY: HAVE YOU HAD ANY THOUGHTS OF HARMING ANYONE ELSE?: NO

## 2025-02-24 SDOH — ECONOMIC STABILITY: FOOD INSECURITY: WITHIN THE PAST 12 MONTHS, THE FOOD YOU BOUGHT JUST DIDN'T LAST AND YOU DIDN'T HAVE MONEY TO GET MORE.: NEVER TRUE

## 2025-02-24 SDOH — SOCIAL STABILITY: SOCIAL INSECURITY: HAVE YOU HAD THOUGHTS OF HARMING ANYONE ELSE?: NO

## 2025-02-24 SDOH — SOCIAL STABILITY: SOCIAL INSECURITY: HAS ANYONE EVER THREATENED TO HURT YOUR FAMILY OR YOUR PETS?: NO

## 2025-02-24 SDOH — SOCIAL STABILITY: SOCIAL INSECURITY: ARE THERE ANY APPARENT SIGNS OF INJURIES/BEHAVIORS THAT COULD BE RELATED TO ABUSE/NEGLECT?: NO

## 2025-02-24 ASSESSMENT — PATIENT HEALTH QUESTIONNAIRE - PHQ9
SUM OF ALL RESPONSES TO PHQ9 QUESTIONS 1 & 2: 0
1. LITTLE INTEREST OR PLEASURE IN DOING THINGS: NOT AT ALL
2. FEELING DOWN, DEPRESSED OR HOPELESS: NOT AT ALL

## 2025-02-24 ASSESSMENT — LIFESTYLE VARIABLES
HAVE YOU EVER FELT YOU SHOULD CUT DOWN ON YOUR DRINKING: NO
EVER HAD A DRINK FIRST THING IN THE MORNING TO STEADY YOUR NERVES TO GET RID OF A HANGOVER: NO
HOW OFTEN DO YOU HAVE 6 OR MORE DRINKS ON ONE OCCASION: NEVER
SKIP TO QUESTIONS 9-10: 1
PRESCIPTION_ABUSE_PAST_12_MONTHS: NO
HAVE PEOPLE ANNOYED YOU BY CRITICIZING YOUR DRINKING: NO
HOW OFTEN DO YOU HAVE A DRINK CONTAINING ALCOHOL: NEVER
AUDIT-C TOTAL SCORE: 0
SUBSTANCE_ABUSE_PAST_12_MONTHS: NO
TOTAL SCORE: 0
AUDIT-C TOTAL SCORE: 0
HOW MANY STANDARD DRINKS CONTAINING ALCOHOL DO YOU HAVE ON A TYPICAL DAY: PATIENT DOES NOT DRINK
EVER FELT BAD OR GUILTY ABOUT YOUR DRINKING: NO

## 2025-02-24 ASSESSMENT — COGNITIVE AND FUNCTIONAL STATUS - GENERAL
MOBILITY SCORE: 24
DAILY ACTIVITIY SCORE: 24
PATIENT BASELINE BEDBOUND: NO
MOBILITY SCORE: 24
DAILY ACTIVITIY SCORE: 24

## 2025-02-24 ASSESSMENT — ACTIVITIES OF DAILY LIVING (ADL)
TOILETING: INDEPENDENT
HEARING - LEFT EAR: FUNCTIONAL
HEARING - RIGHT EAR: FUNCTIONAL
WALKS IN HOME: INDEPENDENT
DRESSING YOURSELF: INDEPENDENT
FEEDING YOURSELF: INDEPENDENT
LACK_OF_TRANSPORTATION: NO
JUDGMENT_ADEQUATE_SAFELY_COMPLETE_DAILY_ACTIVITIES: YES
BATHING: INDEPENDENT
PATIENT'S MEMORY ADEQUATE TO SAFELY COMPLETE DAILY ACTIVITIES?: YES
GROOMING: INDEPENDENT
ADEQUATE_TO_COMPLETE_ADL: YES

## 2025-02-24 ASSESSMENT — COLUMBIA-SUICIDE SEVERITY RATING SCALE - C-SSRS
6. HAVE YOU EVER DONE ANYTHING, STARTED TO DO ANYTHING, OR PREPARED TO DO ANYTHING TO END YOUR LIFE?: NO
1. IN THE PAST MONTH, HAVE YOU WISHED YOU WERE DEAD OR WISHED YOU COULD GO TO SLEEP AND NOT WAKE UP?: NO
6. HAVE YOU EVER DONE ANYTHING, STARTED TO DO ANYTHING, OR PREPARED TO DO ANYTHING TO END YOUR LIFE?: NO
2. HAVE YOU ACTUALLY HAD ANY THOUGHTS OF KILLING YOURSELF?: NO
2. HAVE YOU ACTUALLY HAD ANY THOUGHTS OF KILLING YOURSELF?: NO
1. IN THE PAST MONTH, HAVE YOU WISHED YOU WERE DEAD OR WISHED YOU COULD GO TO SLEEP AND NOT WAKE UP?: NO

## 2025-02-24 ASSESSMENT — PAIN - FUNCTIONAL ASSESSMENT: PAIN_FUNCTIONAL_ASSESSMENT: 0-10

## 2025-02-24 ASSESSMENT — PAIN SCALES - GENERAL: PAINLEVEL_OUTOF10: 0 - NO PAIN

## 2025-02-24 NOTE — ED PROVIDER NOTES
History of Present Illness     History provided by: Patient and Family Member  Limitations to History: None  External Records Reviewed with Brief Summary: None    HPI:  Santana Casey is a 44 y.o. male  started feeling some left arm and left leg numbness reports he started feeling his feeling yesterday at 5 AM greater than 24 hours.  For this reason he is not a stroke alert.  Patient reports the numbness has been consistent and for this reason decided come into the ED today.  Patient has extensive cardiac history.  He has total of 6 stents placed.  NIH is 2.    Physical Exam   Triage vitals:  T 36.5 °C (97.7 °F)  HR 71  /80  RR 20  O2 98 % None (Room air)    General: Well appearing and in no acute distress.  HEENT: NCAT. PERRL. Oropharynx pink and moist.  CV: Regular rate, regular rhythm. No murmurs, rubs, or gallops.  Resp: Normal effort. CTAB.  GI: Soft. No tenderness to palpation. No rebound or guarding.  Extremities: No lower extremity edema. 2+ radial pulses bilaterally.  Neuro: Moving all extremities bilaterally.  Left sided numbness  Psych: Appropriate mood and affect    Medical Decision Making & ED Course   Medical Decision Making:  This is a 44 y.o. male patient again presented to the ER with chief complaint of chest pain and left-sided numbness.  Patient for complete imaging and blood work.  Workup is negative however due to the persistent numbness in the left side greater than 24 hours patient will be admitted to the hospital for further evaluation treatment.  Patient understands a plan discussed with inpatient team they also accepted the patient for further evaluation treatment.  ----  Scoring Tools Utilized:  NIH 2     Differential diagnoses considered include but are not limited to: TIA, CAD, CVA     Social Determinants of Health which Significantly Impact Care: None identified   EKG Independent Interpretation: EKG interpreted by myself. Please see ED Course for full  interpretation.        Chronic conditions affecting the patient's care: As documented above in MDM    The patient was discussed with the following consultants/services: Hospitalist/Admitting Provider who accepted the patient for admission    Care Considerations: As documented above in MDM    ED Course:  ED Course as of 02/24/25 1444   Mon Feb 24, 2025   1037 EKG performed at 1037 showing normal sinus rhythm no ST elevation or depression no indication of a STEMI at this time ventricular rate of 66 interpreted by me. [KA]      ED Course User Index  [KA] Ryan Monique DO         Diagnoses as of 02/24/25 1444   Chest pain, unspecified type   Left sided numbness     Disposition       Procedures   Procedures    Ryan Monique DO  Emergency Medicine      Ryan Monique DO  02/24/25 1445

## 2025-02-24 NOTE — CONSULTS
Inpatient consult to Neurology  Consult performed by: Jose Newell MD  Consult ordered by: Mireya Richter DO          History Of Present Illness  Santana Casey is a 44 y.o. male presenting with Left-sided numbness.  The patient states that yesterday at around 5 AM while at [employer] he had the acute onset of chest pain and left upper and lower extremity numbness.  The patient states that he continued to have symptoms and today at work he felt as though they got slightly worse.  The patient reports having left facial numbness, left arm numbness and left lower extremity numbness.  He also feels that his left side is somewhat weak.  The patient came to the ER and had an NIH stroke scale score of 2.  The patient had a CT scan brain done that was negative for any acute process.  He feels that his numbness is somewhat improved but he still mildly weak in the left side.  He denies any history of stroke but does have a history of coronary artery disease, hypertension and hyperlipidemia.  The patient is maintained on Brilinta and aspirin.      Past Medical History  The patient has a past medical history of hypertension, hyperlipidemia, MI and CAD.  Surgical History  Past Surgical History:   Procedure Laterality Date    FOREIGN BODY REMOVAL     The patient's past surgical history significant for multiple coronary artery stent placements.  Social History  Social History     Tobacco Use    Smoking status: Never    Smokeless tobacco: Never   Substance Use Topics    Alcohol use: Never    Drug use: Never   The patient social history shows that he is  and is a former smoker and drinker.  The patient has not had anything to smoke or drink for 20 years.    Allergies  Patient has no known allergies.  (Not in a hospital admission)    Family history  The patient's family history shows that his father  at the age of 42 of coronary artery disease and he has 2 brothers who also  in their 50s from coronary artery  "disease.  His mother  of stomach cancer.    Review of Systems   All other systems reviewed and are negative.    Neurological Exam  Physical Exam  Last Recorded Vitals  Blood pressure 126/84, pulse 69, temperature 36.5 °C (97.7 °F), temperature source Tympanic, resp. rate (!) 35, height 1.753 m (5' 9\"), weight 94.3 kg (208 lb), SpO2 98%.    The patient is a well developed, [mildly obese] male in no acute distress.    The patient's funduscopic examination shows no papilledema bilaterally.    The patient's extremity examination shows that the pulses are 2+ in the upper and lower extremities bilaterally and there is no edema in the lower extremities bilaterally.    The patient's mental status testing is alert and oriented ×3 with no evidence of aphasia or dysarthria.  The patient's memory testing, fund of knowledge and concentration are all within normal limits.  The patient's cranial nerves 2, 3, 4, 5, 6, 7, 8, 9, 10, 11 and 12 are all within normal limits.  The patient's motor testing shows normal tone, bulk, and power in the upper and lower extremities bilaterally with exception of very mild left-sided weakness.  The patient's sensory testing is intact to light touch in the upper and lower extremities bilaterally.  The patient's cerebellar testing is intact in the upper and lower extremities bilaterally.  The patient's station and gait are normal.  The patient's reflexes are 1+ in the upper and lower extremities and symmetrical.    Relevant Results     with the exception of very mild left-sided weakness.  NIH Stroke Scale  1A. Level of Consciousness: Alert, Keenly Responsive  1B. Ask Month and Age: Both Questions Right  1C. Blink Eyes & Squeeze Hands: Performs Both Tasks  2. Best Gaze: Normal  3. Visual: No Visual Loss  4. Facial Palsy: Normal Symmetrical Movements  5A. Motor - Left Arm: No Drift  5B. Motor - Right Arm: No Drift  6A. Motor - Left Leg: Drift  6B. Motor - Right Leg: No Drift  7. Limb Ataxia: " Absent  8. Sensory Loss: Normal  9. Best Language: No Aphasia  10. Dysarthria: Normal  11. Extinction and Inattention: No Abnormality  NIH Stroke Scale: 1           Clare Coma Scale  Best Eye Response: Spontaneous  Best Verbal Response: Oriented  Best Motor Response: Follows commands  Kar Coma Scale Score: 15        Scheduled medications  aspirin, 81 mg, oral, Daily  [START ON 2/25/2025] metoprolol succinate XL, 25 mg, oral, Daily  perflutren lipid microspheres, 0.5-10 mL of dilution, intravenous, Once in imaging  perflutren protein A microsphere, 0.5 mL, intravenous, Once in imaging  rosuvastatin, 20 mg, oral, Nightly  sulfur hexafluoride microsphr, 2 mL, intravenous, Once in imaging  ticagrelor, 90 mg, oral, BID      Continuous medications     PRN medications  PRN medications: acetaminophen, alum-mag hydroxide-simeth, melatonin, polyethylene glycol    Results for orders placed or performed during the hospital encounter of 02/24/25 (from the past 96 hours)   Protime-INR   Result Value Ref Range    Protime 11.8 9.8 - 12.8 seconds    INR 1.0 0.9 - 1.1   APTT   Result Value Ref Range    aPTT 29 27 - 38 seconds   Magnesium   Result Value Ref Range    Magnesium 2.06 1.60 - 2.40 mg/dL   Troponin I, High Sensitivity, Initial   Result Value Ref Range    Troponin I, High Sensitivity <3 0 - 20 ng/L   Comprehensive metabolic panel   Result Value Ref Range    Glucose 100 (H) 74 - 99 mg/dL    Sodium 140 136 - 145 mmol/L    Potassium 4.3 3.5 - 5.3 mmol/L    Chloride 105 98 - 107 mmol/L    Bicarbonate 24 21 - 32 mmol/L    Anion Gap 15 10 - 20 mmol/L    Urea Nitrogen 12 6 - 23 mg/dL    Creatinine 1.13 0.50 - 1.30 mg/dL    eGFR 81 >60 mL/min/1.73m*2    Calcium 9.5 8.6 - 10.3 mg/dL    Albumin 4.5 3.4 - 5.0 g/dL    Alkaline Phosphatase 42 33 - 120 U/L    Total Protein 7.5 6.4 - 8.2 g/dL    AST 22 9 - 39 U/L    Bilirubin, Total 0.7 0.0 - 1.2 mg/dL    ALT 20 10 - 52 U/L   Troponin, High Sensitivity, 1 Hour   Result Value Ref  Range    Troponin I, High Sensitivity <3 0 - 20 ng/L   CBC and Auto Differential   Result Value Ref Range    WBC 8.4 4.4 - 11.3 x10*3/uL    nRBC 0.0 0.0 - 0.0 /100 WBCs    RBC 4.85 4.50 - 5.90 x10*6/uL    Hemoglobin 14.5 13.5 - 17.5 g/dL    Hematocrit 42.5 41.0 - 52.0 %    MCV 88 80 - 100 fL    MCH 29.9 26.0 - 34.0 pg    MCHC 34.1 32.0 - 36.0 g/dL    RDW 12.3 11.5 - 14.5 %    Platelets 218 150 - 450 x10*3/uL    Neutrophils % 51.9 40.0 - 80.0 %    Immature Granulocytes %, Automated 0.6 0.0 - 0.9 %    Lymphocytes % 36.9 13.0 - 44.0 %    Monocytes % 8.4 2.0 - 10.0 %    Eosinophils % 1.8 0.0 - 6.0 %    Basophils % 0.4 0.0 - 2.0 %    Neutrophils Absolute 4.38 1.20 - 7.70 x10*3/uL    Immature Granulocytes Absolute, Automated 0.05 0.00 - 0.70 x10*3/uL    Lymphocytes Absolute 3.11 1.20 - 4.80 x10*3/uL    Monocytes Absolute 0.71 0.10 - 1.00 x10*3/uL    Eosinophils Absolute 0.15 0.00 - 0.70 x10*3/uL    Basophils Absolute 0.03 0.00 - 0.10 x10*3/uL   Transthoracic Echo (TTE) Complete   Result Value Ref Range    BSA 2.14 m2            I have personally reviewed the following imaging results CT head wo IV contrast    Result Date: 2/24/2025  Interpreted By:  Jose L Stone, STUDY: CT HEAD WO IV CONTRAST; ;  2/24/2025 10:50 am   INDICATION: Signs/Symptoms:left arm weakness.   COMPARISON: None.   ACCESSION NUMBER(S): [id number]   ORDERING CLINICIAN: JUSTIN GERONIMO   TECHNIQUE: Contiguous unenhanced axial CT sections are performed from the skull base to the vertex.   FINDINGS: The osseous structures are intact. The visualized portions of the paranasal sinuses and mastoid air cells are clear.   The cortical sulci and CSF spaces are symmetric in appearance. There is no sign of parenchymal hematoma or dense extra-axial fluid collection. There is no localized edema, mass effect, or shift of the midline. The gray matter/white-matter differentiation is preserved.       No CT evidence of acute intracranial abnormality.     MACRO: None    Signed by: Jose L Stone 2/24/2025 11:05 AM Dictation workstation:   [id number]       Assessment/Plan   Assessment & Plan  Chest pain, unspecified type    Left sided numbness      Impression: The patient is a 44-year-old male with multiple stroke risk factors who presents with left-sided numbness and weakness.  His neurological examination is mildly abnormal and noted above.  The differential diagnosis for his left-sided numbness and weakness includes cerebral infarction and TIA secondary to intra or extracranial atherosclerotic disease, small vessel stroke or a cardiac source for embolism.    Plan: The patient needs an MRI of the brain without contrast as well as an MRA of the neck and brain.  The patient needs an echocardiogram, lipid panel and hemoglobin A1c.  The patient will need a Zio patch for 2 weeks.  The patient can continue his Brilinta and aspirin.  The patient needs to continue stroke risk factor modification.   The patient needs a PT, OT, social service, rehab and speech therapy consult.  The patient needs DVT prophylaxis.  The patient needs neurochecks as per protocol.  I will send the note to Dr. Richter.  Thank you very much for sending me this very interesting consultation.  I discussed all these issues in detail with the patient and answered all their questions.  I will continue to follow the patient while they are in the hospital.  The patient needs follow-up with their primary care doctor within 2 weeks of discharge.  On discharge, the patient will follow up with me in the office in 4 months.        Jose Newell MD

## 2025-02-24 NOTE — H&P
Hospital Medicine History & Physical    Subjective:  Santana Casey is a 44 y.o. male with HTN, HLD, CAD s/p 6 stents on Brilinta/asa, who presents with chest pain and left sided numbness. Patient was at [employer] praying yesterday when he developed left sided sharp chest pain radiating to his underarm and left upper extremity numbness. He went home and rested with improvement of the CP. States the CP did not feel similar to when he had 6 stents placed ~2 years ago. Today he woke up with the same sympts and came to the ED. At some point he also noticed left lower extremity numbness and noticed he felt off balance while ambulating. He also endorses fatigue, mild blurry vision, room spinning sensation, and slower speech. He denies unilateral weakness, word finding difficulty, vision changes, facial droop or other sympts. No assoc sob or diaphoresis.       A 10 point ROS was completed and is negative expect as stated in HPI.     PMHx: As above  PSHx: 6 SUKHJINDER  Social Hx: former smoker, denies alcohol use, denies illicit drug use  Family Hx: premature CAD    Objective:    /75   Pulse 57   Temp 36.5 °C (97.7 °F) (Tympanic)   Resp 13   Wt 94.3 kg (208 lb)   SpO2 97%   BMI 30.72 kg/m²     Physical Exam:  General: A&Ox3, no distress, cooperative  HEENT: NC/AT, clear sclera, MMM  NECK: Supple  Cardiovascular: RRR, no murmurs. S1/S2  Respiratory: CTAB, no RRW or crackles. No distress  Abdomen: Soft, ND, non-tender  Extremities: No peripheral edema  Neurological: A&Ox3. NIHSS 1for left sided numbness of face, UE and LE  Skin: Warm and dry, no rashes  Psych: appropriate mood and affect    I personally reviewed all imaging, labs and notes/ documentation.     Assessment & Plan:    Left sided numbness concerning for ischemic CVA    HTN  HLD  CAD s/p 6 stents on Brilinta/asa    Stroke work up with MRI/MRAs, echo, lipid panel/ A1c, continue asa, brilinta, statin, consult neuro, neuro checks per protocol  Resume home meds, no need  for permissive hypertension as sympts have been present for >24 hours    DVT Prophylaxis: low risk- ambulate  Code Status: Full Code   Disposition: Anisha Richter DO  Hospitalist

## 2025-02-24 NOTE — ED TRIAGE NOTES
Pt presents to ED c/o chest pain to sternal region that radiates into left upper chest down the left arm. Pt states pain began yesterday morning around 0500. Pt reports a burning sensation under the left armpit and intermittent numbness to left arm that began when chest pain began around 0500 yesterday. Pt reports feeling fatigued. Denies cough, SOB. Pt has hx of 6 stents.

## 2025-02-24 NOTE — PROGRESS NOTES
Pharmacy Medication History Review    Santana Casey is a 44 y.o. male admitted for No Principal Problem: There is no principal problem currently on the Problem List. Please update the Problem List and refresh.. Pharmacy reviewed the patient's [id number] medications and allergies for accuracy.    The list below reflectives the updated PTA list. Please review each medication in order reconciliation for additional clarification and justification.  Prior to Admission medications    Medication Sig Start Date End Date Taking? Authorizing Provider   acetaminophen (Tylenol) 325 mg tablet Take 2 tablets (650 mg) by mouth every 6 hours if needed for mild pain (1 - 3) or fever (temp greater than 38.0 C). 2/8/24  Yes Historical Provider, MD   aspirin 81 mg EC tablet Take 1 tablet (81 mg) by mouth once daily. 11/20/24  Yes LIBRADO Wilson   metoprolol succinate XL (Toprol-XL) 25 mg 24 hr tablet Take 1 tablet (25 mg) by mouth once daily. Do not crush or chew. Patient taking 0.5 daily 11/25/24  Yes LIBRADO Wilson   rosuvastatin (Crestor) 20 mg tablet Take 1 tablet (20 mg) by mouth once daily.  Patient taking differently: Take 1 tablet (20 mg) by mouth once daily at bedtime. 5/29/24 5/29/25 Yes Rodríguez Sanchez MD PhD   ticagrelor (Brilinta) 90 mg tablet Take 1 tablet (90 mg) by mouth 2 times a day. 2/10/25  Yes Rodríguez Sanchez MD PhD        The list below reflectives the updated allergy list. Please review each documented allergy for additional clarification and justification.  Allergies  Reviewed by Julia Amezcua on 2/24/2025   No Known Allergies         Below are additional concerns with the patient's PTA list.      Julia Amezcua

## 2025-02-25 ENCOUNTER — APPOINTMENT (OUTPATIENT)
Dept: CARDIOLOGY | Facility: HOSPITAL | Age: 45
End: 2025-02-25
Payer: COMMERCIAL

## 2025-02-25 VITALS
WEIGHT: 208 LBS | TEMPERATURE: 97.3 F | RESPIRATION RATE: 16 BRPM | BODY MASS INDEX: 30.81 KG/M2 | HEIGHT: 69 IN | DIASTOLIC BLOOD PRESSURE: 77 MMHG | HEART RATE: 70 BPM | SYSTOLIC BLOOD PRESSURE: 132 MMHG | OXYGEN SATURATION: 95 %

## 2025-02-25 LAB
ANION GAP SERPL CALC-SCNC: 11 MMOL/L (ref 10–20)
AORTIC VALVE MEAN GRADIENT: 2 MMHG
AORTIC VALVE PEAK VELOCITY: 0.91 M/S
AV PEAK GRADIENT: 3 MMHG
AVA (PEAK VEL): 1.58 CM2
AVA (VTI): 1.6 CM2
BODY SURFACE AREA: 2.14 M2
BUN SERPL-MCNC: 15 MG/DL (ref 6–23)
CALCIUM SERPL-MCNC: 8.8 MG/DL (ref 8.6–10.3)
CHLORIDE SERPL-SCNC: 105 MMOL/L (ref 98–107)
CHOLEST SERPL-MCNC: 120 MG/DL (ref 0–199)
CHOLESTEROL/HDL RATIO: 3.5
CO2 SERPL-SCNC: 26 MMOL/L (ref 21–32)
CREAT SERPL-MCNC: 1.09 MG/DL (ref 0.5–1.3)
EGFRCR SERPLBLD CKD-EPI 2021: 85 ML/MIN/1.73M*2
EJECTION FRACTION APICAL 4 CHAMBER: 48.3
EJECTION FRACTION: 58 %
ERYTHROCYTE [DISTWIDTH] IN BLOOD BY AUTOMATED COUNT: 12.1 % (ref 11.5–14.5)
EST. AVERAGE GLUCOSE BLD GHB EST-MCNC: 105 MG/DL
GLUCOSE BLD MANUAL STRIP-MCNC: 98 MG/DL (ref 74–99)
GLUCOSE SERPL-MCNC: 94 MG/DL (ref 74–99)
HBA1C MFR BLD: 5.3 %
HCT VFR BLD AUTO: 45.9 % (ref 41–52)
HDLC SERPL-MCNC: 34.7 MG/DL
HGB BLD-MCNC: 15.4 G/DL (ref 13.5–17.5)
LDLC SERPL CALC-MCNC: 44 MG/DL
LEFT VENTRICLE INTERNAL DIMENSION DIASTOLE: 5.2 CM (ref 3.5–6)
LEFT VENTRICULAR OUTFLOW TRACT DIAMETER: 1.7 CM
MCH RBC QN AUTO: 29.8 PG (ref 26–34)
MCHC RBC AUTO-ENTMCNC: 33.6 G/DL (ref 32–36)
MCV RBC AUTO: 89 FL (ref 80–100)
MITRAL VALVE E/A RATIO: 1.53
NON HDL CHOLESTEROL: 85 MG/DL (ref 0–149)
NRBC BLD-RTO: 0 /100 WBCS (ref 0–0)
PLATELET # BLD AUTO: 214 X10*3/UL (ref 150–450)
POTASSIUM SERPL-SCNC: 3.9 MMOL/L (ref 3.5–5.3)
RBC # BLD AUTO: 5.17 X10*6/UL (ref 4.5–5.9)
RIGHT VENTRICLE FREE WALL PEAK S': 15.6 CM/S
RIGHT VENTRICLE PEAK SYSTOLIC PRESSURE: 15.4 MMHG
SODIUM SERPL-SCNC: 138 MMOL/L (ref 136–145)
TRICUSPID ANNULAR PLANE SYSTOLIC EXCURSION: 2.8 CM
TRIGL SERPL-MCNC: 207 MG/DL (ref 0–149)
VLDL: 41 MG/DL (ref 0–40)
WBC # BLD AUTO: 8.4 X10*3/UL (ref 4.4–11.3)

## 2025-02-25 PROCEDURE — 85027 COMPLETE CBC AUTOMATED: CPT | Performed by: INTERNAL MEDICINE

## 2025-02-25 PROCEDURE — 2500000001 HC RX 250 WO HCPCS SELF ADMINISTERED DRUGS (ALT 637 FOR MEDICARE OP): Performed by: INTERNAL MEDICINE

## 2025-02-25 PROCEDURE — 80048 BASIC METABOLIC PNL TOTAL CA: CPT | Performed by: INTERNAL MEDICINE

## 2025-02-25 PROCEDURE — 83036 HEMOGLOBIN GLYCOSYLATED A1C: CPT | Mod: PARLAB | Performed by: INTERNAL MEDICINE

## 2025-02-25 PROCEDURE — 99238 HOSP IP/OBS DSCHRG MGMT 30/<: CPT | Performed by: INTERNAL MEDICINE

## 2025-02-25 PROCEDURE — 99232 SBSQ HOSP IP/OBS MODERATE 35: CPT | Performed by: NURSE PRACTITIONER

## 2025-02-25 PROCEDURE — 82947 ASSAY GLUCOSE BLOOD QUANT: CPT

## 2025-02-25 PROCEDURE — G0378 HOSPITAL OBSERVATION PER HR: HCPCS

## 2025-02-25 PROCEDURE — 93246 EXT ECG>7D<15D RECORDING: CPT

## 2025-02-25 PROCEDURE — 36415 COLL VENOUS BLD VENIPUNCTURE: CPT | Performed by: INTERNAL MEDICINE

## 2025-02-25 PROCEDURE — 80061 LIPID PANEL: CPT | Performed by: INTERNAL MEDICINE

## 2025-02-25 PROCEDURE — 2500000002 HC RX 250 W HCPCS SELF ADMINISTERED DRUGS (ALT 637 FOR MEDICARE OP, ALT 636 FOR OP/ED): Performed by: INTERNAL MEDICINE

## 2025-02-25 RX ADMIN — ASPIRIN 81 MG: 81 TABLET, COATED ORAL at 09:18

## 2025-02-25 RX ADMIN — METOPROLOL SUCCINATE 25 MG: 25 TABLET, EXTENDED RELEASE ORAL at 09:19

## 2025-02-25 RX ADMIN — TICAGRELOR 90 MG: 90 TABLET ORAL at 09:19

## 2025-02-25 ASSESSMENT — COGNITIVE AND FUNCTIONAL STATUS - GENERAL
MOBILITY SCORE: 24
DAILY ACTIVITIY SCORE: 24

## 2025-02-25 ASSESSMENT — PAIN SCALES - GENERAL: PAINLEVEL_OUTOF10: 0 - NO PAIN

## 2025-02-25 NOTE — DISCHARGE SUMMARY
Discharge Diagnosis  Chest pain, non-cardiac  TIA    Issues Requiring Follow-Up  As above    Discharge Meds     Medication List      CHANGE how you take these medications     rosuvastatin 20 mg tablet; Commonly known as: Crestor; Take 1 tablet (20   mg) by mouth once daily.; What changed: when to take this     CONTINUE taking these medications     acetaminophen 325 mg tablet; Commonly known as: Tylenol   aspirin 81 mg EC tablet; Take 1 tablet (81 mg) by mouth once daily.   metoprolol succinate XL 25 mg 24 hr tablet; Commonly known as:   Toprol-XL; Take 1 tablet (25 mg) by mouth once daily. Do not crush or   chew. Patient taking 0.5 daily   ticagrelor 90 mg tablet; Commonly known as: Brilinta; Take 1 tablet (90   mg) by mouth 2 times a day.       Test Results Pending At Discharge  Pending Labs       No current pending labs.            Hospital Course   Santana Casey is a 44 y.o. male with HTN, HLD, CAD s/p 6 stents on Brilinta/asa, who presented with chest pain and left sided numbness. Patient was at [employer] praying the day before when he developed left sided sharp chest pain radiating to his underarm and left upper extremity numbness. He went home and rested with improvement of the CP. States the CP did not feel similar to when he had 6 stents placed ~2 years ago. Day of admission he woke up with the same sympts and came to the ED. At some point he also noticed left lower extremity numbness and noticed he felt off balance while ambulating. Workup with negative head CT, negative trops and no concerning EKG changes. Pt admitted for neuro workup and neuro consulted. MRI brain/ MRAs and echo were all negative for acute. Pt cleared for dc by neuro with 2 week cardiac event monitor. He is to continue his statin and DAPT. He is to follow up with neuro and PCP. His left sided numbness is improving. Plan discussed with pt and his family.     Pertinent Physical Exam At Time of Discharge  Physical Exam  General: A&Ox3, no  distress, cooperative  HEENT: NC/AT, clear sclera, MMM  NECK: Supple  Cardiovascular: RRR, no murmurs. S1/S2  Respiratory: CTAB, no RRW or crackles. No distress  Abdomen: Soft, ND, non-tender  Extremities: No peripheral edema  Neurological: A&Ox3. NIHSS 1for left sided numbness of face, UE and LE overall improving  Skin: Warm and dry, no rashes  Psych: appropriate mood and affect    Outpatient Follow-Up  Future Appointments   Date Time Provider Department Center   7/28/2025 11:15 AM Rodríguez Sanchez MD PhD [id number] [place]   9/12/2025  2:00 PM Marylu Hamilton OD [id number] [place]   PCP, neuro      Mireya Richter DO

## 2025-02-25 NOTE — CARE PLAN
The patient's goals for the shift include pt. Will not have any neuro deficits during shift.     The clinical goals for the shift include Patient will not have any further neuro deficits throughout end of shift

## 2025-02-25 NOTE — CARE PLAN
The patient's goals for the shift include      The clinical goals for the shift include Patient will not have any further neuro deficits throughout end of shift      Problem: Pain - Adult  Goal: Verbalizes/displays adequate comfort level or baseline comfort level  Outcome: Progressing     Problem: Safety - Adult  Goal: Free from fall injury  Outcome: Progressing     Problem: Discharge Planning  Goal: Discharge to home or other facility with appropriate resources  Outcome: Progressing     Problem: Chronic Conditions and Co-morbidities  Goal: Patient's chronic conditions and co-morbidity symptoms are monitored and maintained or improved  Outcome: Progressing     Problem: Nutrition  Goal: Nutrient intake appropriate for maintaining nutritional needs  Outcome: Progressing     Problem: General Stroke  Goal: Demonstrate improvement in neurological exam throughout the shift  Outcome: Progressing  Goal: Maintain BP within ordered limits throughout shift  Outcome: Progressing  Goal: No symptoms of aspiration throughout shift  Outcome: Progressing

## 2025-02-25 NOTE — PROGRESS NOTES
25 1416   Discharge Planning   Living Arrangements Spouse/significant other;Children   Support Systems Family members   Assistance Needed None   Type of Residence Private residence   Do you have animals or pets at home? No   Who is requesting discharge planning? Provider   Home or Post Acute Services None   Expected Discharge Disposition Home   Does the patient need discharge transport arranged? No     TCC Note: Spoke with pt and family, verified name, , demographics, insurance, PCP is Elio Prater and ER contact is wife, Anni, phone: [phone]. Pt lives with family and was independent prior to admission. No recent falls in the past 3 months, No use of assistive devices, Pt is not diabetic and does not use home O2. Pharmacy is [facility]. Family will transport pt home. Pt will need heart monitor upon discharge. Family has questions regarding that. Informed RN. Latonia Spring, MSN, RN, TCC.

## 2025-02-25 NOTE — DISCHARGE INSTRUCTIONS
-It is recommended for you to wear a 14-day continuous cardiac event monitor at discharge to rule out evidence of cardiac arrhythmias that could have provoked stroke symptoms

## 2025-02-25 NOTE — NURSING NOTE
02/25/25 [id number] Patient Navigator   I introduced myself to the patient, his wife Anni, and explained my role. Pt states he has his family support at needed. He states he is attempting to be active and I informed him of the recommendations of the AHA to exercise 30 minutes 5 days a week. We discussed healthy food options, the handouts listed below, the following lab values, and what they indicate: cholesterol, HDL, LDL, triglycerides, and A1C. Please see the education tab for additional information. I gave the patient my business card & instructed him to call/email me as needed. They appreciated my visit and verbalized understanding of the above note. I have updated the patient's RN, Nelly, of my visit.    Handouts:   Stroke booklet  What can I eat? American Diabetes Association  Lifestyle changes to prevent a stroke- American Stroke Association  How can I improve my cholesterol? [place] [employer]      Aida HARDY, RN  Patient Navigator  Stroke Educator  Diabetes Care &

## 2025-02-25 NOTE — NURSING NOTE
Met with patient and family at the bedside. Discharge Planning discussed. AVS updated with follow-ups, education, and medication information. Verified/ entered a PCP and pharmacy. New medications and side effects discussed. All questions answered.  Updated nurse on this info. AVS printed and hi-lighted. IV and tele removed. Holter Monitor applied.

## 2025-02-25 NOTE — PROGRESS NOTES
"Santana Casey is a 44 y.o. male on day 0 of admission presenting with Chest pain, unspecified type.      Subjective   MRI/a of the brain, head, and neck all without contrast have been completed and are negative for any acute findings.  Echocardiogram completed and noting ejection fraction of 55 to 60% with a negative bubble study.  LDL 44 and A1c 5.3%.  As full neurologic workup for suspected TIAs completed, patient is appropriate for discharge from neurologic standpoint and to continue previously prescribed ASA/Brilinta and Crestor.  Neurology to sign off on care.       Objective     Last Recorded Vitals  Blood pressure 133/80, pulse 68, temperature 36 °C (96.8 °F), resp. rate 16, height 1.753 m (5' 9\"), weight 94.3 kg (208 lb), SpO2 96%.    Physical exam/neurological exam  Patient seen and examined at this time; upon entering room he is resting quietly in bed. Appears fully developed and well nourished.   Mental status: A&Ox3. Memory testing, fund of knowledge and concentration WNL. Speech is fluent and negative for any paraphrasic errors.     Cranial Nerves:  Optic II/ Oculomotor III: Fundoscopic exam was technically difficult. PERRL +2. Visual fields are full. Convergence and accomodation noted without difficulty. Negative for deficits to visual acuity confrontation via static-finger wiggle test. Eyes appear aligned and free of exophthalmos and ptosis. Sclera are white bilaterally and lens are free from clouding.   Oculomotor III/ Trochlear IV/ Abducens VI: Extraocular movements are full, with no evidence of nystagmus. Negative for diplopia.   Trigeminal V: Facial sensation is intact to light touch. Corneal reflex responsive when threatened bilaterally.  Facial VII: intact; nose is midline, with no evidence of flattening to nasolabial folds noted and mouth is negative for evidence of droop. Patient is successfully able to follow commands to raise eyebrows, squeeze eyes shut, smile and show teeth, frown, and puff " out cheeks.   Acoustic VIII: Hearing is intact bilaterally.  Glossopharngyeal IX/ Vagus X: Palate elevates symmetrically to phonation. Findings are negative for uvula deviation or dysphagia.   Spinal accessory XI: Sternocleidomastoid/ upper trapezius is 5/56 to strength testing. No asymmetry noted to strength, bulk, or tone.   Hypoglossal XII: Tongue is midline and without deviations. Phonation is articulate and is negative for findings of dysarthria or aphasia.     Motor exam: negative for evidence of involuntary movements or fasiculations. BUE flexion of biceps and brachioradialis graded 5/5, in addition to extension of triceps at elbow and wrists. BUE  strength 5/5, along with finger abduction and thumb opposition. BLE hip flexion, extension, adduction, and abduction 5/5. Knee extension & flexion 5/5. Ankle dorsiflexion and plantarflexion 5/5. Normal bulk and normal tone.     Sensory exam: Sensation is intact to light touch throughout.    Reflexes: Reflexes are 2+ and symmetric. Bilateral plantar responses are flexor. Ankle jerks symmetric.     Coordination: finger-to-nose testing is negative for signs of dysmetria. Pronator drift testing to BUE negative. Rapid alternating hand movements WNL.    Gait exam: negative for ataxia.    Relevant Results  Scheduled medications  aspirin, 81 mg, oral, Daily  metoprolol succinate XL, 25 mg, oral, Daily  perflutren lipid microspheres, 0.5-10 mL of dilution, intravenous, Once in imaging  perflutren protein A microsphere, 0.5 mL, intravenous, Once in imaging  rosuvastatin, 20 mg, oral, Nightly  sulfur hexafluoride microsphr, 2 mL, intravenous, Once in imaging  ticagrelor, 90 mg, oral, BID      Continuous medications     PRN medications  PRN medications: acetaminophen, alum-mag hydroxide-simeth, melatonin, polyethylene glycol  Transthoracic Echo (TTE) Complete    Result Date: 2/25/2025   [facility], [address] [facility] [address]           Tel [phone]  and Fax [phone] TRANSTHORACIC ECHOCARDIOGRAM REPORT  Patient Name:       GABRIEL EDUARDO         Reading Physician:    [id number] Rodríguez Sanchez MD Study Date:         2/24/2025           Ordering Provider:    [id number] HORTENCIA ARELLANOMARLENE MRN/PID:            [id number]            Fellow: Accession#:         [id number]        Nurse: Date of Birth/Age:  1980 / 44      Sonographer:          Leonides vivas RDCS Gender assigned at  M                   Additional Staff: Birth: Height:             175.00 cm           Admit Date:           2/23/2025 Weight:             94.00 kg            Admission Status:     Inpatient -                                                               Priority                                                               discharge BSA / BMI:          2.09 m2 / 30.69     Encounter#:           [id number]                     kg/m2 Blood Pressure:     126/84 mmHg         Department Location:  [facility] Study Type:    TRANSTHORACIC ECHO (TTE) COMPLETE Diagnosis/ICD: Weakness-R53.1 Indication:    left sided numbness, stroke rule out CPT Code:      Echo Complete w Full [id number] Patient History: Pertinent History: Chest Pain, CAD and Hyperlipidemia. Study Detail: The following Echo studies were performed: 2D, M-Mode, Doppler and               color flow. Technically challenging study due to poor acoustic               windows. Agitated saline used as a contrast agent for intraseptal               flow evaluation.  PHYSICIAN INTERPRETATION: Left Ventricle: Left ventricular ejection fraction is normal, by visual estimate at 55-60%. There are no regional left ventricular wall motion abnormalities. The left ventricular cavity size is normal. There is normal septal and normal posterior left ventricular wall  thickness. Spectral Doppler shows a normal pattern of left ventricular diastolic filling. Left Atrium: The left atrial size is normal. A bubble study using agitated saline was performed. Bubble study is negative. Right Ventricle: The right ventricle is normal in size. There is normal right ventricular global systolic function. Right Atrium: The right atrium is normal in size. Aortic Valve: The aortic valve is trileaflet. The aortic valve dimensionless index is 0.70. There is no evidence of aortic valve regurgitation. The peak instantaneous gradient of the aortic valve is 3 mmHg. The mean gradient of the aortic valve is 2 mmHg. Mitral Valve: The mitral valve is normal in structure. There is trace mitral valve regurgitation. Tricuspid Valve: The tricuspid valve is structurally normal. There is trace tricuspid regurgitation. The Doppler estimated RVSP is within normal limits at 15.4 mmHg. Pulmonic Valve: The pulmonic valve is structurally normal. There is trace pulmonic valve regurgitation. Pericardium: There is no pericardial effusion noted. Aorta: The aortic root is normal.  CONCLUSIONS:  1. Left ventricular ejection fraction is normal, by visual estimate at 55-60%.  2. Spectral Doppler shows a normal pattern of left ventricular diastolic filling.  3. There is normal right ventricular global systolic function.  4. Right ventricular systolic pressure is within normal limits. QUANTITATIVE DATA SUMMARY:  2D MEASUREMENTS:          Normal Ranges: IVSd:            0.80 cm  (0.6-1.1cm) LVPWd:           0.80 cm  (0.6-1.1cm) LVIDd:           5.20 cm  (3.9-5.9cm) LVIDs:           3.80 cm LV Mass Index:   69 g/m2 LVEDV Index:     50 ml/m2 LV % FS          26.9 %  AORTA MEASUREMENTS:         Normal Ranges: Asc Ao, d:          2.70 cm (2.1-3.4cm)  LV SYSTOLIC FUNCTION:                      Normal Ranges: EF-A2C View:    65 % EF-Biplane:     56 % EF-Visual:      58 % LV EF Reported: 58 %  LV DIASTOLIC FUNCTION:           Normal  Ranges: MV Peak E:             0.91 m/s  (0.7-1.2 m/s) MV Peak A:             0.59 m/s  (0.42-0.7 m/s) E/A Ratio:             1.53      (1.0-2.2) MV e'                  0.116 m/s (>8.0) MV lateral e'          0.12 m/s MV medial e'           0.11 m/s E/e' Ratio:            7.83      (<8.0)  MITRAL VALVE:          Normal Ranges: MV DT:        169 msec (150-240msec)  AORTIC VALVE:                     Normal Ranges: AoV Vmax:                0.91 m/s (<=1.7m/s) AoV Peak PG:             3.3 mmHg (<20mmHg) AoV Mean P.0 mmHg (1.7-11.5mmHg) LVOT Max Rocky:            0.63 m/s (<=1.1m/s) AoV VTI:                 19.30 cm (18-25cm) LVOT VTI:                13.60 cm LVOT Diameter:           1.70 cm  (1.8-2.4cm) AoV Dimensionless Index: 0.70  RIGHT VENTRICLE: TAPSE: 28.3 mm RV s'  0.16 m/s  TRICUSPID VALVE/RVSP:          Normal Ranges: Peak TR Velocity:     1.76 m/s RV Syst Pressure:     15 mmHg  (< 30mmHg)  PULMONIC VALVE:          Normal Ranges: PV Max Rocky:     1.1 m/s  (0.6-0.9m/s) PV Max P.8 mmHg PV Mean PG:     3.0 mmHg PV VTI:         23.50 cm  [id number] Rodríguez Sanchez MD Electronically signed on 2025 at 8:31:17 AM  ** Final **     ECG 12 lead    Result Date: 2025  Normal sinus rhythm Normal ECG No previous ECGs available    ECG 12 lead    Result Date: 2025  Sinus bradycardia Otherwise normal ECG When compared with ECG of 2025 10:37, (unconfirmed) No significant change was found    MR brain wo IV contrast    Result Date: 2025  Interpreted By:  Fawn Ferreira, STUDY: MR BRAIN WO IV CONTRAST; MR ANGIO NECK WO IV CONTRAST; MR ANGIO HEAD WO IV CONTRAST;  2025 8:06 pm   INDICATION: Signs/Symptoms:left sided numbness.  Stroke protocol.     COMPARISON: CT head 2025   ACCESSION NUMBER(S): [id number]; [id number]; [id number]   ORDERING CLINICIAN: HORTENCIA GREGORIO   TECHNIQUE: Axial T2, FLAIR, DWI, gradient echo T2 and  sagittal and coronal T1 weighted images of brain  were acquired.   Time-of-flight MRA of the head  and neck was performed. The images were reviewed as source images and maximum intensity projections.   FINDINGS: Brain: Motion artifact degrades imaging.   CSF Spaces: The ventricles, sulci and basal cisterns enlarged, concordant with parenchymal volume loss.   Parenchyma: There is no diffusion restriction abnormality to suggest acute infarct.  Humeral distance and T2/FLAIR hyperintense foci noted in prominent perivascular space in the right parietal lobe. There is no mass effect or midline shift.   Paranasal Sinuses and Mastoids: Mucosal thickening in the left maxillary sinus and ethmoid air cells. Mastoid air cells are patent.   MRA of head:   Anterior circulation:    There is expected flow signal in bilateral intracranial internal carotid arteries, bilateral carotid terminals, bilateral proximal anterior and middle cerebral arteries.   Posterior circulation:    Bilateral intracranial vertebral arteries, vertebrobasilar junction, basilar artery and proximal posterior cerebral arteries demonstrate expected flow signal.   MRA of neck:   The source images are mildly degraded by artifact.   Right carotid vessels:  There is expected flow signal in the visualized portion of the common carotid artery.  There is mild attenuation of flow signal at the carotid bifurcation which may be secondary to flow related artifact. The internal carotid artery in the neck demonstrates expected flow signal.   Left carotid vessels:   There is expected flow signal in the visualized portion of the common carotid artery.  There is mild attenuation of flow signal at the carotid bifurcation which may be secondary to flow related artifact. The internal carotid artery in the neck demonstrates expected flow signal.   Vertebral vessels:   The visualized segments of the cervical vertebral arteries demonstrate expected flow signal.       MRI Brain:   No evidence of acute infarct, intracranial mass  effect or midline shift.   MRA:   No evidence of major vessel cutoff or significant stenosis on MRA head and neck.   MACRO: None   Signed by: Fawn Ferreira 2/24/2025 8:40 PM Dictation workstation:   [id number]    MR angio head wo IV contrast    Result Date: 2/24/2025  Interpreted By:  Fawn Ferreira, STUDY: MR BRAIN WO IV CONTRAST; MR ANGIO NECK WO IV CONTRAST; MR ANGIO HEAD WO IV CONTRAST;  2/24/2025 8:06 pm   INDICATION: Signs/Symptoms:left sided numbness.  Stroke protocol.     COMPARISON: CT head 02/24/2025   ACCESSION NUMBER(S): [id number]; [id number]; [id number]   ORDERING CLINICIAN: HORTENCIA GREGORIO   TECHNIQUE: Axial T2, FLAIR, DWI, gradient echo T2 and  sagittal and coronal T1 weighted images of brain were acquired.   Time-of-flight MRA of the head  and neck was performed. The images were reviewed as source images and maximum intensity projections.   FINDINGS: Brain: Motion artifact degrades imaging.   CSF Spaces: The ventricles, sulci and basal cisterns enlarged, concordant with parenchymal volume loss.   Parenchyma: There is no diffusion restriction abnormality to suggest acute infarct.  Humeral distance and T2/FLAIR hyperintense foci noted in prominent perivascular space in the right parietal lobe. There is no mass effect or midline shift.   Paranasal Sinuses and Mastoids: Mucosal thickening in the left maxillary sinus and ethmoid air cells. Mastoid air cells are patent.   MRA of head:   Anterior circulation:    There is expected flow signal in bilateral intracranial internal carotid arteries, bilateral carotid terminals, bilateral proximal anterior and middle cerebral arteries.   Posterior circulation:    Bilateral intracranial vertebral arteries, vertebrobasilar junction, basilar artery and proximal posterior cerebral arteries demonstrate expected flow signal.   MRA of neck:   The source images are mildly degraded by artifact.   Right carotid vessels:  There is expected flow signal in the  visualized portion of the common carotid artery.  There is mild attenuation of flow signal at the carotid bifurcation which may be secondary to flow related artifact. The internal carotid artery in the neck demonstrates expected flow signal.   Left carotid vessels:   There is expected flow signal in the visualized portion of the common carotid artery.  There is mild attenuation of flow signal at the carotid bifurcation which may be secondary to flow related artifact. The internal carotid artery in the neck demonstrates expected flow signal.   Vertebral vessels:   The visualized segments of the cervical vertebral arteries demonstrate expected flow signal.       MRI Brain:   No evidence of acute infarct, intracranial mass effect or midline shift.   MRA:   No evidence of major vessel cutoff or significant stenosis on MRA head and neck.   MACRO: None   Signed by: Fawn Ferreira 2/24/2025 8:40 PM Dictation workstation:   [id number]    MR angio neck wo IV contrast    Result Date: 2/24/2025  Interpreted By:  Fawn Ferreira, STUDY: MR BRAIN WO IV CONTRAST; MR ANGIO NECK WO IV CONTRAST; MR ANGIO HEAD WO IV CONTRAST;  2/24/2025 8:06 pm   INDICATION: Signs/Symptoms:left sided numbness.  Stroke protocol.     COMPARISON: CT head 02/24/2025   ACCESSION NUMBER(S): [id number]; [id number]; [id number]   ORDERING CLINICIAN: HORTENCIA GREGORIO   TECHNIQUE: Axial T2, FLAIR, DWI, gradient echo T2 and  sagittal and coronal T1 weighted images of brain were acquired.   Time-of-flight MRA of the head  and neck was performed. The images were reviewed as source images and maximum intensity projections.   FINDINGS: Brain: Motion artifact degrades imaging.   CSF Spaces: The ventricles, sulci and basal cisterns enlarged, concordant with parenchymal volume loss.   Parenchyma: There is no diffusion restriction abnormality to suggest acute infarct.  Humeral distance and T2/FLAIR hyperintense foci noted in prominent perivascular space in the  right parietal lobe. There is no mass effect or midline shift.   Paranasal Sinuses and Mastoids: Mucosal thickening in the left maxillary sinus and ethmoid air cells. Mastoid air cells are patent.   MRA of head:   Anterior circulation:    There is expected flow signal in bilateral intracranial internal carotid arteries, bilateral carotid terminals, bilateral proximal anterior and middle cerebral arteries.   Posterior circulation:    Bilateral intracranial vertebral arteries, vertebrobasilar junction, basilar artery and proximal posterior cerebral arteries demonstrate expected flow signal.   MRA of neck:   The source images are mildly degraded by artifact.   Right carotid vessels:  There is expected flow signal in the visualized portion of the common carotid artery.  There is mild attenuation of flow signal at the carotid bifurcation which may be secondary to flow related artifact. The internal carotid artery in the neck demonstrates expected flow signal.   Left carotid vessels:   There is expected flow signal in the visualized portion of the common carotid artery.  There is mild attenuation of flow signal at the carotid bifurcation which may be secondary to flow related artifact. The internal carotid artery in the neck demonstrates expected flow signal.   Vertebral vessels:   The visualized segments of the cervical vertebral arteries demonstrate expected flow signal.       MRI Brain:   No evidence of acute infarct, intracranial mass effect or midline shift.   MRA:   No evidence of major vessel cutoff or significant stenosis on MRA head and neck.   MACRO: None   Signed by: Fawn Ferreira 2/24/2025 8:40 PM Dictation workstation:   [id number]    CT head wo IV contrast    Result Date: 2/24/2025  Interpreted By:  Jose L Stone, STUDY: CT HEAD WO IV CONTRAST; ;  2/24/2025 10:50 am   INDICATION: Signs/Symptoms:left arm weakness.   COMPARISON: None.   ACCESSION NUMBER(S): [id number]   ORDERING CLINICIAN: JUSTIN  ANDC   TECHNIQUE: Contiguous unenhanced axial CT sections are performed from the skull base to the vertex.   FINDINGS: The osseous structures are intact. The visualized portions of the paranasal sinuses and mastoid air cells are clear.   The cortical sulci and CSF spaces are symmetric in appearance. There is no sign of parenchymal hematoma or dense extra-axial fluid collection. There is no localized edema, mass effect, or shift of the midline. The gray matter/white-matter differentiation is preserved.       No CT evidence of acute intracranial abnormality.     MACRO: None   Signed by: Jose L Stone 2/24/2025 11:05 AM Dictation workstation:   [id number]   Results for orders placed or performed during the hospital encounter of 02/24/25 (from the past 24 hours)   CBC and Auto Differential   Result Value Ref Range    WBC 8.4 4.4 - 11.3 x10*3/uL    nRBC 0.0 0.0 - 0.0 /100 WBCs    RBC 4.85 4.50 - 5.90 x10*6/uL    Hemoglobin 14.5 13.5 - 17.5 g/dL    Hematocrit 42.5 41.0 - 52.0 %    MCV 88 80 - 100 fL    MCH 29.9 26.0 - 34.0 pg    MCHC 34.1 32.0 - 36.0 g/dL    RDW 12.3 11.5 - 14.5 %    Platelets 218 150 - 450 x10*3/uL    Neutrophils % 51.9 40.0 - 80.0 %    Immature Granulocytes %, Automated 0.6 0.0 - 0.9 %    Lymphocytes % 36.9 13.0 - 44.0 %    Monocytes % 8.4 2.0 - 10.0 %    Eosinophils % 1.8 0.0 - 6.0 %    Basophils % 0.4 0.0 - 2.0 %    Neutrophils Absolute 4.38 1.20 - 7.70 x10*3/uL    Immature Granulocytes Absolute, Automated 0.05 0.00 - 0.70 x10*3/uL    Lymphocytes Absolute 3.11 1.20 - 4.80 x10*3/uL    Monocytes Absolute 0.71 0.10 - 1.00 x10*3/uL    Eosinophils Absolute 0.15 0.00 - 0.70 x10*3/uL    Basophils Absolute 0.03 0.00 - 0.10 x10*3/uL   ECG 12 lead   Result Value Ref Range    Ventricular Rate 55 BPM    Atrial Rate 55 BPM    MA Interval 188 ms    QRS Duration 94 ms    QT Interval 418 ms    QTC Calculation(Bazett) 399 ms    P Axis 42 degrees    R Axis 6 degrees    T Axis 23 degrees    QRS Count 9 beats     Q Onset 215 ms    P Onset 121 ms    P Offset 176 ms    T Offset 424 ms    QTC Fredericia 406 ms   Transthoracic Echo (TTE) Complete   Result Value Ref Range    AV pk omar 0.91 m/s    AV mn grad 2 mmHg    LVOT diam 1.70 cm    MV E/A ratio 1.53     Tricuspid annular plane systolic excursion 2.8 cm    LV EF 58 %    RV free wall pk S' 15.60 cm/s    LVIDd 5.20 cm    RVSP 15.4 mmHg    Aortic Valve Area by Continuity of VTI 1.60 cm2    Aortic Valve Area by Continuity of Peak Velocity 1.58 cm2    AV pk grad 3 mmHg    LV A4C EF 48.3    POCT GLUCOSE   Result Value Ref Range    POCT Glucose 104 (H) 74 - 99 mg/dL   Lipid Panel   Result Value Ref Range    Cholesterol 120 0 - 199 mg/dL    HDL-Cholesterol 34.7 mg/dL    Cholesterol/HDL Ratio 3.5     LDL Calculated 44 <=99 mg/dL    VLDL 41 (H) 0 - 40 mg/dL    Triglycerides 207 (H) 0 - 149 mg/dL    Non HDL Cholesterol 85 0 - 149 mg/dL   Basic Metabolic Panel   Result Value Ref Range    Glucose 94 74 - 99 mg/dL    Sodium 138 136 - 145 mmol/L    Potassium 3.9 3.5 - 5.3 mmol/L    Chloride 105 98 - 107 mmol/L    Bicarbonate 26 21 - 32 mmol/L    Anion Gap 11 10 - 20 mmol/L    Urea Nitrogen 15 6 - 23 mg/dL    Creatinine 1.09 0.50 - 1.30 mg/dL    eGFR 85 >60 mL/min/1.73m*2    Calcium 8.8 8.6 - 10.3 mg/dL   CBC   Result Value Ref Range    WBC 8.4 4.4 - 11.3 x10*3/uL    nRBC 0.0 0.0 - 0.0 /100 WBCs    RBC 5.17 4.50 - 5.90 x10*6/uL    Hemoglobin 15.4 13.5 - 17.5 g/dL    Hematocrit 45.9 41.0 - 52.0 %    MCV 89 80 - 100 fL    MCH 29.8 26.0 - 34.0 pg    MCHC 33.6 32.0 - 36.0 g/dL    RDW 12.1 11.5 - 14.5 %    Platelets 214 150 - 450 x10*3/uL   Hemoglobin A1C   Result Value Ref Range    Hemoglobin A1C 5.3 See comment %    Estimated Average Glucose 105 Not Established mg/dL   POCT GLUCOSE   Result Value Ref Range    POCT Glucose 98 74 - 99 mg/dL             NIH Stroke Scale  1A. Level of Consciousness: Alert, Keenly Responsive  1B. Ask Month and Age: Both Questions Right  1C. Blink Eyes & Squeeze  Hands: Performs Both Tasks  2. Best Gaze: Normal  3. Visual: No Visual Loss  4. Facial Palsy: Normal Symmetrical Movements  5A. Motor - Left Arm: No Drift  5B. Motor - Right Arm: No Drift  6A. Motor - Left Leg: Drift  6B. Motor - Right Leg: No Drift  7. Limb Ataxia: Absent  8. Sensory Loss: Normal  9. Best Language: No Aphasia  10. Dysarthria: Normal  11. Extinction and Inattention: No Abnormality  NIH Stroke Scale: 1           Kar Coma Scale  Best Eye Response: Spontaneous  Best Verbal Response: Oriented  Best Motor Response: Follows commands  Kar Coma Scale Score: 15                             Assessment/Plan      Assessment & Plan  Chest pain, unspecified type    Left sided numbness    -Patient to continue ASA 81 mg p.o. daily and Brilinta 90 mg p.o. twice daily for CVA prophylaxis.  It is also recommended for patient to continue rosuvastatin 20 mg for additional prophylaxis.  -Recommendations for needs during hospitalization and at discharge via PT, OT, and SLP  -Continue promotion of lifestyle modifications, such as: Strict BP and glycemic control, dietary habit changes, incorporation of daily exercise regimen, adherence to all prescription/OTC medication schedules, attendance to all follow-up appointments, cessation from smoking if applicable, abstinence from alcohol and illicit drug use if applicable  -Patient to follow-up with PCP in 1 to 2 weeks post-discharge  -Recommend for patient to wear a 14-day continuous cardiac event monitor at discharge  -Patient to follow-up with Dr. Jose Newell in 4 months post-discharge  Neurology to sign off at this time. Thank you for the opportunity to be a part of this patient's multidisciplinary treatment team.  If any additional questions or concerns arise, please do not hesitate to contact me or the on-call neurologist via [employer] Secure Chat.     I spent 30 minutes in the professional and overall care of this patient.      Deepthi Kelly, APRN-CNP

## 2025-02-28 LAB
ATRIAL RATE: 55 BPM
ATRIAL RATE: 66 BPM
P AXIS: 42 DEGREES
P AXIS: 58 DEGREES
P OFFSET: 176 MS
P OFFSET: 183 MS
P ONSET: 121 MS
P ONSET: 130 MS
PR INTERVAL: 172 MS
PR INTERVAL: 188 MS
Q ONSET: 215 MS
Q ONSET: 216 MS
QRS COUNT: 11 BEATS
QRS COUNT: 9 BEATS
QRS DURATION: 92 MS
QRS DURATION: 94 MS
QT INTERVAL: 372 MS
QT INTERVAL: 418 MS
QTC CALCULATION(BAZETT): 389 MS
QTC CALCULATION(BAZETT): 399 MS
QTC FREDERICIA: 384 MS
QTC FREDERICIA: 406 MS
R AXIS: 30 DEGREES
R AXIS: 6 DEGREES
T AXIS: 20 DEGREES
T AXIS: 23 DEGREES
T OFFSET: 402 MS
T OFFSET: 424 MS
VENTRICULAR RATE: 55 BPM
VENTRICULAR RATE: 66 BPM

## 2025-03-20 LAB — BODY SURFACE AREA: 2.14 M2

## 2025-03-24 LAB — BODY SURFACE AREA: 2.14 M2

## 2025-03-24 PROCEDURE — 93248 EXT ECG>7D<15D REV&INTERPJ: CPT | Performed by: INTERNAL MEDICINE

## 2025-03-27 ENCOUNTER — OFFICE VISIT (OUTPATIENT)
Dept: PRIMARY CARE | Facility: CLINIC | Age: 45
End: 2025-03-27
Payer: COMMERCIAL

## 2025-03-27 VITALS
HEIGHT: 69 IN | DIASTOLIC BLOOD PRESSURE: 81 MMHG | SYSTOLIC BLOOD PRESSURE: 143 MMHG | HEART RATE: 58 BPM | TEMPERATURE: 97.3 F | OXYGEN SATURATION: 100 % | BODY MASS INDEX: 31.84 KG/M2 | RESPIRATION RATE: 16 BRPM | WEIGHT: 215 LBS

## 2025-03-27 DIAGNOSIS — R20.2 NUMBNESS AND TINGLING IN LEFT ARM: ICD-10-CM

## 2025-03-27 DIAGNOSIS — R20.0 NUMBNESS AND TINGLING IN LEFT ARM: ICD-10-CM

## 2025-03-27 DIAGNOSIS — I25.10 CORONARY ARTERY DISEASE WITH HISTORY OF MYOCARDIAL INFARCTION WITHOUT HISTORY OF CABG: ICD-10-CM

## 2025-03-27 DIAGNOSIS — I25.2 CORONARY ARTERY DISEASE WITH HISTORY OF MYOCARDIAL INFARCTION WITHOUT HISTORY OF CABG: ICD-10-CM

## 2025-03-27 DIAGNOSIS — R29.818 SUSPECTED SLEEP APNEA: ICD-10-CM

## 2025-03-27 DIAGNOSIS — E78.5 HYPERLIPIDEMIA, UNSPECIFIED HYPERLIPIDEMIA TYPE: ICD-10-CM

## 2025-03-27 DIAGNOSIS — R53.83 OTHER FATIGUE: Primary | ICD-10-CM

## 2025-03-27 DIAGNOSIS — I10 HYPERTENSION, UNSPECIFIED TYPE: ICD-10-CM

## 2025-03-27 DIAGNOSIS — Z95.5 S/P CORONARY ARTERY STENT PLACEMENT: ICD-10-CM

## 2025-03-27 DIAGNOSIS — R07.9 CHEST PAIN, UNSPECIFIED TYPE: ICD-10-CM

## 2025-03-27 PROCEDURE — 99214 OFFICE O/P EST MOD 30 MIN: CPT | Performed by: NURSE PRACTITIONER

## 2025-03-27 PROCEDURE — 3077F SYST BP >= 140 MM HG: CPT | Performed by: NURSE PRACTITIONER

## 2025-03-27 PROCEDURE — 36415 COLL VENOUS BLD VENIPUNCTURE: CPT | Performed by: NURSE PRACTITIONER

## 2025-03-27 PROCEDURE — 3079F DIAST BP 80-89 MM HG: CPT | Performed by: NURSE PRACTITIONER

## 2025-03-27 PROCEDURE — 3008F BODY MASS INDEX DOCD: CPT | Performed by: NURSE PRACTITIONER

## 2025-03-27 PROCEDURE — 1036F TOBACCO NON-USER: CPT | Performed by: NURSE PRACTITIONER

## 2025-03-27 RX ORDER — ROSUVASTATIN CALCIUM 20 MG/1
20 TABLET, COATED ORAL DAILY
Qty: 90 TABLET | Refills: 3 | Status: SHIPPED | OUTPATIENT
Start: 2025-03-27 | End: 2026-03-27

## 2025-03-27 RX ORDER — METOPROLOL SUCCINATE 25 MG/1
25 TABLET, EXTENDED RELEASE ORAL DAILY
Qty: 90 TABLET | Refills: 1 | Status: SHIPPED | OUTPATIENT
Start: 2025-03-27

## 2025-03-27 RX ORDER — ASPIRIN 81 MG/1
81 TABLET ORAL DAILY
Qty: 90 TABLET | Refills: 1 | Status: SHIPPED | OUTPATIENT
Start: 2025-03-27

## 2025-03-27 ASSESSMENT — PATIENT HEALTH QUESTIONNAIRE - PHQ9
SUM OF ALL RESPONSES TO PHQ9 QUESTIONS 1 AND 2: 0
2. FEELING DOWN, DEPRESSED OR HOPELESS: NOT AT ALL
1. LITTLE INTEREST OR PLEASURE IN DOING THINGS: NOT AT ALL

## 2025-03-27 ASSESSMENT — ENCOUNTER SYMPTOMS
LOSS OF SENSATION IN FEET: 0
DEPRESSION: 0
OCCASIONAL FEELINGS OF UNSTEADINESS: 0

## 2025-03-27 ASSESSMENT — PAIN SCALES - GENERAL: PAINLEVEL_OUTOF10: 0-NO PAIN

## 2025-03-27 NOTE — PATIENT INSTRUCTIONS
Thank you for coming in for your visit today!    Please follow up in 5 months or sooner if needed.    For your blood pressure:  Take your medication as directed. Try to take it around the same time daily.   Keep a log of your blood pressure. Be sure to bring it with you to your next appointment so we can review it together.  Adhere to the DASH diet. This includes decreasing your salt/sodium intake. Avoid canned foods, lunch meats, and frozen foods.  Exercise for 30 minutes daily.    Today we completed blood work. We will contact you with any abnormalities from this testing.    Call to schedule with sleep medicine and vascular.     Call 911 or go to the emergency room if you have pain in your chest, difficulty breathing, or other life threatening symptoms.

## 2025-03-27 NOTE — PROGRESS NOTES
"Sabino Casey is a 44 y.o. male who presents for Follow-up and hospitals discharge.  HPI  Mr. Casey is a 43 yo M here today for follow up  He was in the hospital in February with concern for TIA, all testing negative at that time   He did begin to have symptom improvement (L sided numbness) prior to leaving the hospital.   Follows with cardiology annually, next appointment in July.   He has been compliant with medications.  Denies headache, chest pain, palpitations, blurred vision, or dizziness  He has been trying to \"take life easy\" he relies on his linden heavily and feels very supported by his family. He notes recent weight gain but denies any lower leg edema or difficulty breathing. He notes that he is more sedentary than he was prior to his MI. He was told that he should not run, he instead walks frequently.   He denies feeling down or depressed.   He notes a generalized sensation of fatigue.     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 Notes that he will often have numbness and tingling to his L arm during service, improving with arm breaks/ putting his arm down to his side. His wife notes that his arm will appear discolored/ purple at times during the service.   He notes that during his TIA work up someone told him that there was an issue with a heart valve and that it was \"flushed\" and he felt instant relief.     Also expresses concern for jerking movement and snoring while sleeping. Denies waking, these concerns have been observed by his wife. Denies choking or coughing.     UTD with colonoscopy.       All systems reviewed. Review of systems negative except for noted positives in HPI    Objective     /81   Pulse 58   Temp 36.3 °C (97.3 °F)   Resp 16   Ht 1.753 m (5' 9\")   Wt 97.5 kg (215 lb)   SpO2 100%   BMI 31.75 kg/m²    Vital signs noted and reviewed.       Physical Exam  Constitutional:       Appearance: Normal appearance.   Cardiovascular:      Rate and Rhythm: Normal rate and regular " rhythm.   Pulmonary:      Effort: Pulmonary effort is normal. No respiratory distress.      Breath sounds: Normal breath sounds.   Musculoskeletal:      Comments: +muscle tension to R trapezius   Skin:     General: Skin is warm and dry.   Neurological:      Mental Status: He is oriented to person, place, and time.   Psychiatric:         Mood and Affect: Mood normal.             Assessment/Plan   Problem List Items Addressed This Visit       Hyperlipidemia    Relevant Medications    rosuvastatin (Crestor) 20 mg tablet    Chest pain, unspecified type    Relevant Medications    aspirin 81 mg EC tablet     Other Visit Diagnoses       Other fatigue    -  Primary    Relevant Orders    TSH with reflex to Free T4 if abnormal    Vitamin D 25-Hydroxy,Total (for eval of Vitamin D levels)    Sedimentation Rate    Vitamin B12    Hypertension, unspecified type        Relevant Medications    metoprolol succinate XL (Toprol-XL) 25 mg 24 hr tablet    Coronary artery disease with history of myocardial infarction without history of CABG        Relevant Medications    metoprolol succinate XL (Toprol-XL) 25 mg 24 hr tablet    S/P coronary artery stent placement        Relevant Orders    Referral to Vascular Medicine    Suspected sleep apnea        Relevant Orders    Referral to Adult Sleep Medicine    Numbness and tingling in left arm        Relevant Orders    Referral to Vascular Medicine

## 2025-03-28 LAB
25(OH)D3+25(OH)D2 SERPL-MCNC: 11 NG/ML (ref 30–100)
ERYTHROCYTE [SEDIMENTATION RATE] IN BLOOD BY WESTERGREN METHOD: 2 MM/H
TSH SERPL-ACNC: 2.31 MIU/L (ref 0.4–4.5)
VIT B12 SERPL-MCNC: 496 PG/ML (ref 200–1100)

## 2025-03-29 DIAGNOSIS — E55.9 VITAMIN D DEFICIENCY: Primary | ICD-10-CM

## 2025-03-29 RX ORDER — ERGOCALCIFEROL 1.25 MG/1
50000 CAPSULE ORAL WEEKLY
Qty: 12 CAPSULE | Refills: 0 | Status: SHIPPED | OUTPATIENT
Start: 2025-03-29 | End: 2025-06-21

## 2025-04-02 ENCOUNTER — APPOINTMENT (OUTPATIENT)
Dept: PULMONOLOGY | Facility: CLINIC | Age: 45
End: 2025-04-02
Payer: COMMERCIAL

## 2025-04-09 DIAGNOSIS — E78.5 HYPERLIPIDEMIA, UNSPECIFIED HYPERLIPIDEMIA TYPE: ICD-10-CM

## 2025-04-09 DIAGNOSIS — I10 HYPERTENSION, UNSPECIFIED TYPE: ICD-10-CM

## 2025-04-09 DIAGNOSIS — R07.9 CHEST PAIN, UNSPECIFIED TYPE: ICD-10-CM

## 2025-04-09 DIAGNOSIS — I25.10 ATHEROSCLEROSIS OF NATIVE CORONARY ARTERY OF NATIVE HEART WITHOUT ANGINA PECTORIS: ICD-10-CM

## 2025-04-09 NOTE — TELEPHONE ENCOUNTER
Patient wife called stating [facility] are no longer filling her husbands prescription he has to [facility]

## 2025-04-10 RX ORDER — METOPROLOL SUCCINATE 25 MG/1
25 TABLET, EXTENDED RELEASE ORAL DAILY
Qty: 90 TABLET | Refills: 1 | Status: SHIPPED | OUTPATIENT
Start: 2025-04-10

## 2025-04-10 RX ORDER — ROSUVASTATIN CALCIUM 20 MG/1
20 TABLET, COATED ORAL DAILY
Qty: 90 TABLET | Refills: 3 | Status: SHIPPED | OUTPATIENT
Start: 2025-04-10 | End: 2026-04-10

## 2025-04-10 RX ORDER — ASPIRIN 81 MG/1
81 TABLET ORAL DAILY
Qty: 90 TABLET | Refills: 1 | Status: SHIPPED | OUTPATIENT
Start: 2025-04-10

## 2025-06-04 ENCOUNTER — APPOINTMENT (OUTPATIENT)
Dept: CARDIOLOGY | Facility: CLINIC | Age: 45
End: 2025-06-04
Payer: COMMERCIAL

## 2025-06-09 ENCOUNTER — TELEPHONE (OUTPATIENT)
Dept: CARDIOLOGY | Facility: CLINIC | Age: 45
End: 2025-06-09
Payer: COMMERCIAL

## 2025-06-09 NOTE — TELEPHONE ENCOUNTER
Pt will be scheduled for dental work: fillings, crown and an extraction. Dental work will be done under local anesthesia with epinephrine. Pt is on Brilinta and asa. Pt is s/p cardiac stents in 5/2023.     Can Brilinta and Asa be stopped, how long? Is an antibiotic needed?     Last Dr Sanchez ov, 5/29/24, next ov scheduled for 7/28/25.

## 2025-07-10 PROBLEM — E66.811 CLASS 1 OBESITY WITH BODY MASS INDEX (BMI) OF 31.0 TO 31.9 IN ADULT: Status: ACTIVE | Noted: 2025-07-10

## 2025-07-10 PROBLEM — R07.9 CHEST PAIN: Status: RESOLVED | Noted: 2024-03-15 | Resolved: 2025-07-10

## 2025-07-11 ENCOUNTER — OFFICE VISIT (OUTPATIENT)
Dept: CARDIOLOGY | Facility: HOSPITAL | Age: 45
End: 2025-07-11
Payer: COMMERCIAL

## 2025-07-11 VITALS
WEIGHT: 215 LBS | HEART RATE: 58 BPM | SYSTOLIC BLOOD PRESSURE: 138 MMHG | BODY MASS INDEX: 31.84 KG/M2 | RESPIRATION RATE: 18 BRPM | DIASTOLIC BLOOD PRESSURE: 81 MMHG | HEIGHT: 69 IN

## 2025-07-11 DIAGNOSIS — Z95.5 S/P CORONARY ARTERY STENT PLACEMENT: ICD-10-CM

## 2025-07-11 DIAGNOSIS — R20.0 NUMBNESS AND TINGLING IN LEFT ARM: Primary | ICD-10-CM

## 2025-07-11 DIAGNOSIS — R20.2 NUMBNESS AND TINGLING IN LEFT ARM: Primary | ICD-10-CM

## 2025-07-11 PROCEDURE — 1036F TOBACCO NON-USER: CPT | Performed by: INTERNAL MEDICINE

## 2025-07-11 PROCEDURE — 99204 OFFICE O/P NEW MOD 45 MIN: CPT | Performed by: INTERNAL MEDICINE

## 2025-07-11 PROCEDURE — 3008F BODY MASS INDEX DOCD: CPT | Performed by: INTERNAL MEDICINE

## 2025-07-11 PROCEDURE — 99212 OFFICE O/P EST SF 10 MIN: CPT

## 2025-07-11 ASSESSMENT — PATIENT HEALTH QUESTIONNAIRE - PHQ9
SUM OF ALL RESPONSES TO PHQ9 QUESTIONS 1 AND 2: 0
1. LITTLE INTEREST OR PLEASURE IN DOING THINGS: NOT AT ALL
2. FEELING DOWN, DEPRESSED OR HOPELESS: NOT AT ALL

## 2025-07-11 ASSESSMENT — COLUMBIA-SUICIDE SEVERITY RATING SCALE - C-SSRS
6. HAVE YOU EVER DONE ANYTHING, STARTED TO DO ANYTHING, OR PREPARED TO DO ANYTHING TO END YOUR LIFE?: NO
2. HAVE YOU ACTUALLY HAD ANY THOUGHTS OF KILLING YOURSELF?: NO
1. IN THE PAST MONTH, HAVE YOU WISHED YOU WERE DEAD OR WISHED YOU COULD GO TO SLEEP AND NOT WAKE UP?: NO

## 2025-07-11 NOTE — PROGRESS NOTES
"OUTPATIENT CONSULTATION -  VASCULAR MEDICINE    DOS: 07/11/2025    REQUESTING PHYSICIAN:  LIBRADO Wilson Ref Provider; LIBRADO Joseph PCP     REASON FOR CONSULT:  by report referred for numbness and tingling in the left arm    HISTORY OF PRESENT ILLNESS:     45 yo man by report referred for numbness and tingling in the left arm.   From the admission in 2/2025  \"presented with chest pain and left sided numbness. Patient was at [employer] the day before when he developed left sided sharp chest pain radiating to his underarm and left upper extremity numbness. He went home and rested with improvement of the CP. States the CP did not feel similar to when he had 6 stents placed ~2 years ago. Day of admission he woke up with the same sympts and came to the ED. At some point he also noticed left lower extremity numbness and noticed he felt off balance while ambulating. Workup with negative head CT, negative trops and no concerning EKG changes. Pt admitted for neuro workup and neuro consulted. MRI brain/ MRAs and echo were all negative for acute. Pt cleared for dc by neuro with 2 week cardiac event monitor. He is to continue his statin and DAPT. He is to follow up with neuro and PCP. His left sided numbness is improving.\"    Reports the tingling and numbness has been for 2-3 months. This \"comes and goes\". States at the time of the admission the sx were more persistent but now are less. Reports this is from the arm proximal to distal then returns. Does feel he has some associated weakness or decreased strength. Reports sx come randomly. Reports uses both hands for activities but considers himself right-hand dominant. Sx can happen more than once a day - usually last 1-2 minutes.  Does report some cramping.     Reports occasional sx of the left leg. Had injury to the left axilla many years ago while snowblowing with a slip and trauma to the left axilla.     PMH/PSH:    HTN  HPL  CAD s/p PCI with 6 " "stents  obesity    FAMILY HISTORY:     No VTE  No CTD  No AAA    SOCIAL HISTORY:     Tobacco quit x 16 years  Employment /    REVIEW OF SYSTEMS:     No fevers, chills, weight is stable  No sores, ulcers, rashes, skin lesions  + HA - not atypical,   No SZ, syncope, stroke, TIA  No CP, chest pressure  No cough, SOB  No edema, no calf pain  No ambulatory leg pain  + paraesthesias  UTD colonoscopy    PHYSICAL EXAMINATION:   /81 (BP Location: Left arm, Patient Position: Sitting)   Pulse 58   Resp 18   Ht 1.753 m (5' 9\")   Wt 97.5 kg (215 lb)   BMI 31.75 kg/m²       Gen: Appears well, NAD  HEENT: WNL  No carotid bruits  No SCA bruits  Chest: CTA  CVS: regular without murmur or gallop  Ext: no edema, nontender  Skin: good condition without wounds or lesions  Pulses: DP 2+; PT 2+  Radial and ulnar 2+ terese  EAST - no change in congestion, pallor etc - reports left hand fatigue but no paresthesia  TOS - right short bruit with maneuvers, no sig change in pulse  Left TOS - no change in pulse or bruit  Neuro: grossly normal, CN intact, QUYEN x 4  Mood and affect appropriate    ADDITIONAL DATA:   No compression. Calf measurements; Right- 41.0 CM Left-     I have personally reviewed the following lab and imaging results:  IMPRESSION:  No CT evidence of acute intracranial abnormality.      IMPRESSION:  MRI Brain:      No evidence of acute infarct, intracranial mass effect or midline  shift.      MRA:      No evidence of major vessel cutoff or significant stenosis on MRA  head and neck.      Labs reviewed from the admission - unremarkable.    Holter no afib    CONCLUSIONS:   1. Left ventricular ejection fraction is normal, by visual estimate at 55-60%.   2. Spectral Doppler shows a normal pattern of left ventricular diastolic filling.   3. There is normal right ventricular global systolic function.   4. Right ventricular systolic pressure is within normal limits.     ASSESSMENT/PLAN:  by report " referred for numbness and tingling in the left arm - by report this is intermittent and episodic. Not nec associated with activity and happens frequently at rest.     Clinically his vascular exam is normal. Suspect this may be neurogenic - ?cervical spine, thoracic outlet or even at the wrist. Discussed completing the neuro eval in Sept. ?may need EMG.    Will defer any additional vascular testing for now. Can follow up in Nov if there are ongoing concerns.    Guillermina Marroquin MD

## 2025-07-11 NOTE — PATIENT INSTRUCTIONS
ASSESSMENT/PLAN:  by report referred for numbness and tingling in the left arm - by report this is intermittent and episodic. Not nec associated with activity and happens frequently at rest.     Clinically his vascular exam is normal. Suspect this may be neurogenic - ?cervical spine, thoracic outlet or even at the wrist. Discussed completing the neuro eval in Sept. ?may need EMG.    Will defer any additional vascular testing for now. Can follow up in Nov if there are ongoing concerns.    Guillermina Marroquin MD

## 2025-07-28 ENCOUNTER — APPOINTMENT (OUTPATIENT)
Dept: CARDIOLOGY | Facility: CLINIC | Age: 45
End: 2025-07-28
Payer: COMMERCIAL

## 2025-08-11 ENCOUNTER — APPOINTMENT (OUTPATIENT)
Dept: CARDIOLOGY | Facility: CLINIC | Age: 45
End: 2025-08-11
Payer: COMMERCIAL

## 2025-09-02 ENCOUNTER — APPOINTMENT (OUTPATIENT)
Dept: NEUROLOGY | Facility: CLINIC | Age: 45
End: 2025-09-02
Payer: COMMERCIAL

## 2025-09-12 ENCOUNTER — APPOINTMENT (OUTPATIENT)
Dept: OPHTHALMOLOGY | Facility: CLINIC | Age: 45
End: 2025-09-12
Payer: COMMERCIAL